# Patient Record
Sex: FEMALE | NOT HISPANIC OR LATINO | Employment: STUDENT | URBAN - NONMETROPOLITAN AREA
[De-identification: names, ages, dates, MRNs, and addresses within clinical notes are randomized per-mention and may not be internally consistent; named-entity substitution may affect disease eponyms.]

---
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
## 2023-05-11 DIAGNOSIS — F33.0 MILD EPISODE OF RECURRENT MAJOR DEPRESSIVE DISORDER: ICD-10-CM

## 2023-05-12 RX ORDER — BUPROPION HYDROCHLORIDE 150 MG/1
150 TABLET, EXTENDED RELEASE ORAL 2 TIMES DAILY
Qty: 60 TABLET | Refills: 0 | Status: SHIPPED | OUTPATIENT
Start: 2023-05-12

## 2023-06-22 PROBLEM — L05.01 PILONIDAL CYST WITH ABSCESS: Status: ACTIVE | Noted: 2023-06-22

## 2023-06-22 PROBLEM — F32.A ANXIETY AND DEPRESSION: Status: ACTIVE | Noted: 2022-03-28

## 2023-06-22 PROBLEM — F41.9 ANXIETY AND DEPRESSION: Status: ACTIVE | Noted: 2022-03-28

## 2024-02-14 ENCOUNTER — OFFICE VISIT (OUTPATIENT)
Dept: FAMILY MEDICINE CLINIC | Age: 19
End: 2024-02-14
Payer: COMMERCIAL

## 2024-02-14 VITALS
DIASTOLIC BLOOD PRESSURE: 75 MMHG | TEMPERATURE: 98.1 F | HEIGHT: 60 IN | HEART RATE: 94 BPM | WEIGHT: 176.8 LBS | SYSTOLIC BLOOD PRESSURE: 114 MMHG | BODY MASS INDEX: 34.71 KG/M2

## 2024-02-14 DIAGNOSIS — F41.9 ANXIETY AND DEPRESSION: ICD-10-CM

## 2024-02-14 DIAGNOSIS — Z30.015 ENCOUNTER FOR INITIAL PRESCRIPTION OF VAGINAL RING HORMONAL CONTRACEPTIVE: Primary | ICD-10-CM

## 2024-02-14 DIAGNOSIS — F32.A ANXIETY AND DEPRESSION: ICD-10-CM

## 2024-02-14 PROCEDURE — 99213 OFFICE O/P EST LOW 20 MIN: CPT | Performed by: NURSE PRACTITIONER

## 2024-02-14 RX ORDER — ETONOGESTREL AND ETHINYL ESTRADIOL VAGINAL RING .015; .12 MG/D; MG/D
RING VAGINAL
Qty: 1 EACH | Refills: 2 | Status: SHIPPED | OUTPATIENT
Start: 2024-02-14

## 2024-02-14 RX ORDER — BUPROPION HYDROCHLORIDE 300 MG/1
300 TABLET ORAL DAILY
Qty: 90 TABLET | Refills: 1 | Status: SHIPPED | OUTPATIENT
Start: 2024-02-14

## 2024-03-22 DIAGNOSIS — F32.A ANXIETY AND DEPRESSION: ICD-10-CM

## 2024-03-22 DIAGNOSIS — F41.9 ANXIETY AND DEPRESSION: ICD-10-CM

## 2024-03-22 RX ORDER — BUPROPION HYDROCHLORIDE 300 MG/1
300 TABLET ORAL DAILY
Qty: 90 TABLET | Refills: 1 | Status: SHIPPED | OUTPATIENT
Start: 2024-03-22

## 2024-05-08 DIAGNOSIS — F32.A ANXIETY AND DEPRESSION: ICD-10-CM

## 2024-05-08 DIAGNOSIS — Z30.015 ENCOUNTER FOR INITIAL PRESCRIPTION OF VAGINAL RING HORMONAL CONTRACEPTIVE: ICD-10-CM

## 2024-05-08 DIAGNOSIS — F41.9 ANXIETY AND DEPRESSION: ICD-10-CM

## 2024-05-08 RX ORDER — ETONOGESTREL AND ETHINYL ESTRADIOL VAGINAL RING .015; .12 MG/D; MG/D
RING VAGINAL
Qty: 1 EACH | Refills: 1 | Status: SHIPPED | OUTPATIENT
Start: 2024-05-08

## 2024-05-08 RX ORDER — BUPROPION HYDROCHLORIDE 300 MG/1
300 TABLET ORAL DAILY
Qty: 30 TABLET | Refills: 2 | Status: SHIPPED | OUTPATIENT
Start: 2024-05-08

## 2024-06-12 ENCOUNTER — LAB (OUTPATIENT)
Dept: LAB | Facility: HOSPITAL | Age: 19
End: 2024-06-12
Payer: COMMERCIAL

## 2024-06-12 ENCOUNTER — OFFICE VISIT (OUTPATIENT)
Dept: FAMILY MEDICINE CLINIC | Age: 19
End: 2024-06-12
Payer: COMMERCIAL

## 2024-06-12 VITALS
HEART RATE: 91 BPM | HEIGHT: 60 IN | TEMPERATURE: 98.4 F | SYSTOLIC BLOOD PRESSURE: 122 MMHG | DIASTOLIC BLOOD PRESSURE: 79 MMHG | WEIGHT: 179.2 LBS | BODY MASS INDEX: 35.18 KG/M2

## 2024-06-12 DIAGNOSIS — Z11.59 SCREENING FOR VIRAL DISEASE: ICD-10-CM

## 2024-06-12 DIAGNOSIS — R14.0 ABDOMINAL BLOATING: ICD-10-CM

## 2024-06-12 DIAGNOSIS — K59.09 OTHER CONSTIPATION: ICD-10-CM

## 2024-06-12 DIAGNOSIS — R12 HEARTBURN: ICD-10-CM

## 2024-06-12 DIAGNOSIS — R11.0 NAUSEA: Primary | ICD-10-CM

## 2024-06-12 DIAGNOSIS — R11.0 NAUSEA: ICD-10-CM

## 2024-06-12 DIAGNOSIS — R19.4 FREQUENT BOWEL MOVEMENTS: ICD-10-CM

## 2024-06-12 LAB
ALBUMIN SERPL-MCNC: 4.3 G/DL (ref 3.5–5.2)
ALBUMIN/GLOB SERPL: 1.5 G/DL
ALP SERPL-CCNC: 60 U/L (ref 39–117)
ALT SERPL W P-5'-P-CCNC: 27 U/L (ref 1–33)
ANION GAP SERPL CALCULATED.3IONS-SCNC: 9 MMOL/L (ref 5–15)
AST SERPL-CCNC: 18 U/L (ref 1–32)
BASOPHILS # BLD AUTO: 0.04 10*3/MM3 (ref 0–0.2)
BASOPHILS NFR BLD AUTO: 0.5 % (ref 0–1.5)
BILIRUB SERPL-MCNC: 0.2 MG/DL (ref 0–1.2)
BUN SERPL-MCNC: 12 MG/DL (ref 6–20)
BUN/CREAT SERPL: 16.7 (ref 7–25)
CALCIUM SPEC-SCNC: 8.7 MG/DL (ref 8.6–10.5)
CHLORIDE SERPL-SCNC: 107 MMOL/L (ref 98–107)
CO2 SERPL-SCNC: 22 MMOL/L (ref 22–29)
CREAT SERPL-MCNC: 0.72 MG/DL (ref 0.57–1)
DEPRECATED RDW RBC AUTO: 40.3 FL (ref 37–54)
EGFRCR SERPLBLD CKD-EPI 2021: 122.9 ML/MIN/1.73
EOSINOPHIL # BLD AUTO: 0.17 10*3/MM3 (ref 0–0.4)
EOSINOPHIL NFR BLD AUTO: 2.2 % (ref 0.3–6.2)
ERYTHROCYTE [DISTWIDTH] IN BLOOD BY AUTOMATED COUNT: 13.2 % (ref 12.3–15.4)
GLOBULIN UR ELPH-MCNC: 2.8 GM/DL
GLUCOSE SERPL-MCNC: 83 MG/DL (ref 65–99)
HCT VFR BLD AUTO: 38.2 % (ref 34–46.6)
HCV AB SER QL: NORMAL
HGB BLD-MCNC: 12.3 G/DL (ref 12–15.9)
IMM GRANULOCYTES # BLD AUTO: 0.01 10*3/MM3 (ref 0–0.05)
IMM GRANULOCYTES NFR BLD AUTO: 0.1 % (ref 0–0.5)
LYMPHOCYTES # BLD AUTO: 2.84 10*3/MM3 (ref 0.7–3.1)
LYMPHOCYTES NFR BLD AUTO: 36 % (ref 19.6–45.3)
MCH RBC QN AUTO: 26.5 PG (ref 26.6–33)
MCHC RBC AUTO-ENTMCNC: 32.2 G/DL (ref 31.5–35.7)
MCV RBC AUTO: 82.2 FL (ref 79–97)
MONOCYTES # BLD AUTO: 0.41 10*3/MM3 (ref 0.1–0.9)
MONOCYTES NFR BLD AUTO: 5.2 % (ref 5–12)
NEUTROPHILS NFR BLD AUTO: 4.41 10*3/MM3 (ref 1.7–7)
NEUTROPHILS NFR BLD AUTO: 56 % (ref 42.7–76)
PLATELET # BLD AUTO: 273 10*3/MM3 (ref 140–450)
PMV BLD AUTO: 9.9 FL (ref 6–12)
POTASSIUM SERPL-SCNC: 3.9 MMOL/L (ref 3.5–5.2)
PROT SERPL-MCNC: 7.1 G/DL (ref 6–8.5)
RBC # BLD AUTO: 4.65 10*6/MM3 (ref 3.77–5.28)
SODIUM SERPL-SCNC: 138 MMOL/L (ref 136–145)
TSH SERPL DL<=0.05 MIU/L-ACNC: 1.32 UIU/ML (ref 0.27–4.2)
WBC NRBC COR # BLD AUTO: 7.88 10*3/MM3 (ref 3.4–10.8)

## 2024-06-12 PROCEDURE — 80053 COMPREHEN METABOLIC PANEL: CPT

## 2024-06-12 PROCEDURE — 82784 ASSAY IGA/IGD/IGG/IGM EACH: CPT

## 2024-06-12 PROCEDURE — 99214 OFFICE O/P EST MOD 30 MIN: CPT | Performed by: NURSE PRACTITIONER

## 2024-06-12 PROCEDURE — 84443 ASSAY THYROID STIM HORMONE: CPT | Performed by: NURSE PRACTITIONER

## 2024-06-12 PROCEDURE — 85025 COMPLETE CBC W/AUTO DIFF WBC: CPT

## 2024-06-12 PROCEDURE — 36415 COLL VENOUS BLD VENIPUNCTURE: CPT

## 2024-06-12 PROCEDURE — 86258 DGP ANTIBODY EACH IG CLASS: CPT

## 2024-06-12 PROCEDURE — 83013 H PYLORI (C-13) BREATH: CPT

## 2024-06-12 PROCEDURE — 86803 HEPATITIS C AB TEST: CPT

## 2024-06-12 PROCEDURE — 86364 TISS TRNSGLTMNASE EA IG CLAS: CPT

## 2024-06-12 NOTE — PROGRESS NOTES
Chief Complaint  GI Problem (Having issues with heart burn, bloating, a lot of bowel movements and sometimes constipated, she said she wants to throw up every time she eats.)    Subjective          Brenda Garcia presents to [facility]    History of Present Illness  GI symptoms:   Location:mid to upper abd  Symptoms started: 4 months ago  Associated symptoms: heartburn, bloating, nausea, frequent loose BM's and constipation, abd cramps   Treatment tried: pepcid helped some, now not working, takes mirilax prn, tried to avoid gluten but has gone back to eating gluten   Weight up 3 pounds from last visit    Past Medical History: changes since 2024:      Covid + 24 mild   History of pilonidal cyst       Allergies: since 2-3 mos ago;  mild;  perennial; ; allergy testing has not been pursued;  never received immunotherapy;         Sexually active, uses condoms/nuvaring        Surgical History:      [place] teeth extraction         Family History:       Father:    Positive for Hyperlipidemia and Age 32 had Aortic stent; celiac     Mother:IBS, irritable    Positive for Hyperlipidemia;     Brother : 1    Sister: 1        Social History:      Occupation: part time "[employer]      Marital status:  Single      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 No pertinent past medical history.    No Known Allergies     History reviewed. No pertinent surgical history.     Social History     Tobacco Use    Smoking status: Never     Passive exposure: Past    Smokeless tobacco: Never   Substance Use Topics    Alcohol use: Never       History reviewed. No pertinent family history.     Health Maintenance Due   Topic Date Due    BMI FOLLOWUP  Never done    HEPATITIS C SCREENING  Never done    ANNUAL PHYSICAL  Never done        Current Outpatient Medications on File Prior to Visit   Medication Sig    buPROPion XL (Wellbutrin XL) 300 MG 24 hr tablet  "Take 1 tablet by mouth Daily.    etonogestrel-ethinyl estradiol (NuvaRing) 0.12-0.015 MG/24HR vaginal ring Insert vaginally and leave in place for 3 consecutive weeks, then remove for 1 week.     No current facility-administered medications on file prior to visit.       Immunization History   Administered Date(s) Administered    COVID-19 (PFIZER) Purple Cap Monovalent 04/09/2021, 06/13/2021    DTaP 2003, 2003, 01/29/2004, 11/01/2004, 08/06/2007    DTaP / Hep B / IPV 10/09/2013    Fluzone (or Fluarix & Flulaval for VFC) >6mos 10/07/2019    HPV Quadrivalent 06/23/2014, 01/05/2015    Hep A, 2 Dose 07/23/2013, 06/23/2014    Hep B, Adolescent or Pediatric 01/29/2004, 04/29/2004, 08/17/2004, 07/23/2013, 06/23/2014    Hib (PRP-T) 2003, 2003, 01/29/2004, 11/01/2004    IPV 2003, 2003, 11/01/2004, 08/06/2007    Influenza LAIV (Nasal) 11/27/2009, 12/21/2011, 10/09/2013    Influenza Quad Vaccine (Inpatient) 11/15/2017    MMR 11/01/2004, 08/06/2007    Meningococcal B,(Bexsero) 08/02/2019, 10/07/2019    Meningococcal Conjugate 06/23/2014, 08/02/2019    Meningococcal MCV4P (Menactra) 06/23/2014, 08/02/2019    PEDS-Pneumococcal Conjugate (PCV7) 2003, 01/29/2004, 04/29/2004    Pneumococcal Conjugate 13-Valent (PCV13) 2003, 01/29/2004, 04/29/2004    Tdap 06/23/2014    Trumenba(meningococcal B) 08/02/2019, 10/07/2019    Varicella 08/17/2004, 08/06/2007       Review of Systems   Constitutional:  Negative for fatigue and fever.   Respiratory:  Negative for cough and shortness of breath.    Cardiovascular:  Negative for chest pain and palpitations.   Gastrointestinal:  Negative for blood in stool and vomiting.        Objective     Vitals:    06/12/24 1138   BP: 122/79   BP Location: Right arm   Patient Position: Sitting   Cuff Size: Adult   Pulse: 91   Temp: 98.4 °F (36.9 °C)   TempSrc: Oral   Weight: 81.3 kg (179 lb 3.2 oz)   Height: 152.4 cm (60\")            Physical Exam  Vitals " reviewed.   Constitutional:       General: She is not in acute distress.     Appearance: Normal appearance.   Cardiovascular:      Rate and Rhythm: Normal rate and regular rhythm.      Heart sounds: Normal heart sounds. No murmur heard.  Pulmonary:      Effort: Pulmonary effort is normal. No respiratory distress.      Breath sounds: Normal breath sounds.   Abdominal:      Palpations: Abdomen is soft.      Tenderness: There is abdominal tenderness (mild to right mid abd). There is no guarding or rebound.   Neurological:      Mental Status: She is alert.   Psychiatric:         Mood and Affect: Mood normal.         Behavior: Behavior normal.         Result Review :     The following data was reviewed by: KAYKAY Cruz on 06/12/2024:                       Assessment and Plan      Diagnoses and all orders for this visit:    1. Nausea (Primary)  Assessment & Plan:  Checking labs    Orders:  -     Comprehensive metabolic panel; Future  -     H. Pylori Breath Test - Breath, Lung; Future    2. Other constipation  Assessment & Plan:  Checking labs     Orders:  -     CBC w AUTO Differential; Future  -     Comprehensive metabolic panel; Future    3. Frequent bowel movements  Assessment & Plan:  Checking labs     Orders:  -     TSH Rfx On Abnormal To Free T4    4. Heartburn  Assessment & Plan:  Checking labs, may try a PPI, may need to see GE, discussed diet /life style choices that can help     Orders:  -     Cancel: POC Urea Breath Test  -     H. Pylori Breath Test - Breath, Lung; Future    5. Screening for viral disease  Assessment & Plan:  Discussed and will screen with hep c    Orders:  -     Hepatitis C Antibody; Future    6. Abdominal bloating  Assessment & Plan:  Checking labs, discussed gluten free diet, may need to see GE     Orders:  -     CBC w AUTO Differential; Future  -     Celiac Panel Reflex To Titer; Future  -     Cancel: POC Urea Breath Test  -     H. Pylori Breath Test - Breath, Lung;  Future        Class 2 Severe Obesity (BMI >=35 and <=39.9). Obesity-related health conditions include the following: none. Obesity is unchanged. BMI is is above average; BMI management plan is completed. We discussed portion control and increasing exercise.         Follow Up     Return if symptoms worsen or fail to improve, for followup pending lab results.    Patient was given instructions and counseling regarding her condition or for health maintenance advice. Please see specific information pulled into the AVS if appropriate.

## 2024-06-12 NOTE — ASSESSMENT & PLAN NOTE
Checking labs, may try a PPI, may need to see GE, discussed diet /life style choices that can help

## 2024-06-14 LAB
GLIADIN PEPTIDE IGA SER-ACNC: 4 UNITS (ref 0–19)
IGA SERPL-MCNC: 205 MG/DL (ref 87–352)
TTG IGA SER-ACNC: <2 U/ML (ref 0–3)
UREA BREATH TEST QL: NEGATIVE

## 2024-06-16 DIAGNOSIS — Z30.015 ENCOUNTER FOR INITIAL PRESCRIPTION OF VAGINAL RING HORMONAL CONTRACEPTIVE: ICD-10-CM

## 2024-06-17 DIAGNOSIS — R12 HEARTBURN: Primary | ICD-10-CM

## 2024-06-17 RX ORDER — ETONOGESTREL AND ETHINYL ESTRADIOL VAGINAL .015; .12 MG/D; MG/D
RING VAGINAL
Qty: 2 EACH | Refills: 5 | OUTPATIENT
Start: 2024-06-17

## 2024-06-17 RX ORDER — PANTOPRAZOLE SODIUM 40 MG/1
40 TABLET, DELAYED RELEASE ORAL DAILY
Qty: 30 TABLET | Refills: 2 | Status: SHIPPED | OUTPATIENT
Start: 2024-06-17

## 2024-07-17 DIAGNOSIS — Z30.015 ENCOUNTER FOR INITIAL PRESCRIPTION OF VAGINAL RING HORMONAL CONTRACEPTIVE: ICD-10-CM

## 2024-07-17 RX ORDER — ETONOGESTREL AND ETHINYL ESTRADIOL VAGINAL RING .015; .12 MG/D; MG/D
RING VAGINAL
Qty: 1 EACH | Refills: 1 | Status: SHIPPED | OUTPATIENT
Start: 2024-07-17

## 2024-07-17 NOTE — TELEPHONE ENCOUNTER
Rx Refill Note  Requested Prescriptions     Pending Prescriptions Disp Refills    etonogestrel-ethinyl estradiol (NuvaRing) 0.12-0.015 MG/24HR vaginal ring 1 each 1     Sig: Insert vaginally and leave in place for 3 consecutive weeks, then remove for 1 week.      Last office visit with prescribing clinician: 6/12/2024   Last telemedicine visit with prescribing clinician: Visit date not found   Next office visit with prescribing clinician: Visit date not found       Kimmie Quinn LPN  07/17/24, 11:36 EDT    -5/8/24 !1, RF-1

## 2024-07-17 NOTE — TELEPHONE ENCOUNTER
Seen last month, last visit for this was 2-2024. Will send in refills, then needs follow up re this

## 2024-08-16 DIAGNOSIS — F32.A ANXIETY AND DEPRESSION: ICD-10-CM

## 2024-08-16 DIAGNOSIS — F41.9 ANXIETY AND DEPRESSION: ICD-10-CM

## 2024-08-19 RX ORDER — BUPROPION HYDROCHLORIDE 300 MG/1
300 TABLET ORAL DAILY
Qty: 30 TABLET | Refills: 2 | Status: SHIPPED | OUTPATIENT
Start: 2024-08-19

## 2024-08-25 DIAGNOSIS — Z30.015 ENCOUNTER FOR INITIAL PRESCRIPTION OF VAGINAL RING HORMONAL CONTRACEPTIVE: ICD-10-CM

## 2024-08-26 RX ORDER — ETONOGESTREL AND ETHINYL ESTRADIOL VAGINAL .015; .12 MG/D; MG/D
RING VAGINAL
Qty: 2 EACH | Refills: 0 | Status: SHIPPED | OUTPATIENT
Start: 2024-08-26

## 2024-09-17 ENCOUNTER — OFFICE VISIT (OUTPATIENT)
Dept: FAMILY MEDICINE CLINIC | Age: 19
End: 2024-09-17
Payer: COMMERCIAL

## 2024-09-17 VITALS
BODY MASS INDEX: 34.95 KG/M2 | HEART RATE: 102 BPM | HEIGHT: 60 IN | WEIGHT: 178 LBS | SYSTOLIC BLOOD PRESSURE: 127 MMHG | TEMPERATURE: 98.2 F | DIASTOLIC BLOOD PRESSURE: 86 MMHG

## 2024-09-17 DIAGNOSIS — F32.A ANXIETY AND DEPRESSION: Primary | ICD-10-CM

## 2024-09-17 DIAGNOSIS — F41.9 ANXIETY AND DEPRESSION: Primary | ICD-10-CM

## 2024-09-17 PROCEDURE — 99213 OFFICE O/P EST LOW 20 MIN: CPT | Performed by: NURSE PRACTITIONER

## 2024-09-17 RX ORDER — AMOXICILLIN 500 MG/1
CAPSULE ORAL
COMMUNITY
Start: 2024-09-06

## 2024-09-17 RX ORDER — ESCITALOPRAM OXALATE 10 MG/1
10 TABLET ORAL DAILY
Qty: 30 TABLET | Refills: 2 | Status: SHIPPED | OUTPATIENT
Start: 2024-09-17

## 2024-10-02 DIAGNOSIS — R12 HEARTBURN: ICD-10-CM

## 2024-10-03 RX ORDER — PANTOPRAZOLE SODIUM 40 MG/1
40 TABLET, DELAYED RELEASE ORAL DAILY
Qty: 90 TABLET | Refills: 0 | Status: SHIPPED | OUTPATIENT
Start: 2024-10-03

## 2024-10-29 DIAGNOSIS — Z30.015 ENCOUNTER FOR INITIAL PRESCRIPTION OF VAGINAL RING HORMONAL CONTRACEPTIVE: ICD-10-CM

## 2024-10-29 NOTE — TELEPHONE ENCOUNTER
Rx Refill Note  Requested Prescriptions     Pending Prescriptions Disp Refills    etonogestrel-ethinyl estradiol (NUVARING) 0.12-0.015 MG/24HR vaginal ring [Pharmacy Med Name: ETONOGEST/EE VAG RING] 2 each 5     Sig: INSERT 1 RING VAGINALLY, LEAVE   IN PLACE FOR 3 CONSECUTIVE   WEEKS, REMOVE FOR 1 WEEK, THEN  INSERT NEW RING      Last office visit with prescribing clinician: 9/17/2024   Last telemedicine visit with prescribing clinician: Visit date not found   Next office visit with prescribing clinician: Visit date not found      Ryanne Paz LPN  10/29/24, 08:21 EDT

## 2024-11-04 NOTE — TELEPHONE ENCOUNTER
Failed protocol    LOV  8/25/24    LF  9/20/24  56 days     LMTRC    Needs WWE/PAP with [facility]

## 2024-11-06 ENCOUNTER — OFFICE VISIT (OUTPATIENT)
Dept: FAMILY MEDICINE CLINIC | Age: 19
End: 2024-11-06
Payer: COMMERCIAL

## 2024-11-06 VITALS
HEART RATE: 85 BPM | BODY MASS INDEX: 35.61 KG/M2 | TEMPERATURE: 99.5 F | WEIGHT: 181.4 LBS | DIASTOLIC BLOOD PRESSURE: 78 MMHG | HEIGHT: 60 IN | SYSTOLIC BLOOD PRESSURE: 125 MMHG

## 2024-11-06 DIAGNOSIS — F32.A ANXIETY AND DEPRESSION: ICD-10-CM

## 2024-11-06 DIAGNOSIS — Z23 IMMUNIZATION DUE: ICD-10-CM

## 2024-11-06 DIAGNOSIS — F41.9 ANXIETY AND DEPRESSION: ICD-10-CM

## 2024-11-06 DIAGNOSIS — Z30.44 ENCOUNTER FOR SURVEILLANCE OF VAGINAL RING HORMONAL CONTRACEPTIVE DEVICE: Primary | ICD-10-CM

## 2024-11-06 PROBLEM — R11.0 NAUSEA: Status: RESOLVED | Noted: 2024-06-12 | Resolved: 2024-11-06

## 2024-11-06 PROBLEM — R05.9 COUGH: Status: RESOLVED | Noted: 2021-11-17 | Resolved: 2024-11-06

## 2024-11-06 PROBLEM — J02.9 SORE THROAT: Status: RESOLVED | Noted: 2021-11-17 | Resolved: 2024-11-06

## 2024-11-06 PROBLEM — R14.0 ABDOMINAL BLOATING: Status: RESOLVED | Noted: 2024-06-12 | Resolved: 2024-11-06

## 2024-11-06 PROBLEM — R12 HEARTBURN: Status: RESOLVED | Noted: 2024-06-12 | Resolved: 2024-11-06

## 2024-11-06 PROBLEM — K59.09 OTHER CONSTIPATION: Status: RESOLVED | Noted: 2024-06-12 | Resolved: 2024-11-06

## 2024-11-06 PROBLEM — R19.4 FREQUENT BOWEL MOVEMENTS: Status: RESOLVED | Noted: 2024-06-12 | Resolved: 2024-11-06

## 2024-11-06 PROBLEM — L05.01 PILONIDAL CYST WITH ABSCESS: Status: RESOLVED | Noted: 2023-06-22 | Resolved: 2024-11-06

## 2024-11-06 PROCEDURE — 99213 OFFICE O/P EST LOW 20 MIN: CPT | Performed by: NURSE PRACTITIONER

## 2024-11-06 PROCEDURE — 90656 IIV3 VACC NO PRSV 0.5 ML IM: CPT | Performed by: NURSE PRACTITIONER

## 2024-11-06 PROCEDURE — 90471 IMMUNIZATION ADMIN: CPT | Performed by: NURSE PRACTITIONER

## 2024-11-06 RX ORDER — ETONOGESTREL AND ETHINYL ESTRADIOL VAGINAL RING .015; .12 MG/D; MG/D
RING VAGINAL
Qty: 9 EACH | Refills: 0 | Status: SHIPPED | OUTPATIENT
Start: 2024-11-06

## 2024-11-06 RX ORDER — ETONOGESTREL AND ETHINYL ESTRADIOL VAGINAL RING .015; .12 MG/D; MG/D
RING VAGINAL
Qty: 2 EACH | Refills: 5 | OUTPATIENT
Start: 2024-11-06

## 2024-11-06 RX ORDER — ESCITALOPRAM OXALATE 20 MG/1
20 TABLET ORAL DAILY
Qty: 30 TABLET | Refills: 2 | Status: SHIPPED | OUTPATIENT
Start: 2024-11-06

## 2024-11-06 NOTE — ASSESSMENT & PLAN NOTE
Discussed ACHES, will send in a refill but she is due a Pap smear, advised to schedule with [facility]

## 2024-11-06 NOTE — PROGRESS NOTES
Chief Complaint  Med Refill (Refill on nuvaring. )    Subjective          Brenda Traci Garcia presents to [facility]    History of Present Illness  Needs refills of nuvating   Current contraceptive: nuvaring,needs a refill to her mail order pharmacy  Cycles regular but heavy   Sexually active yes, same partner   Plans on seeeing a GYN for [employer] in the near future, may be moving to [facility]     On lexapro, thinks it is not adequately helping, will finish school next month, wants my recommendation, needs refill to [facility]     Past Medical History: changes since 2024:      Covid + 2-7-24 mild   History of pilonidal cyst        Allergies: since 2-3 mos ago;  mild;  perennial; ; allergy testing has not been pursued;  never received immunotherapy;         Sexually active, uses condoms/nuvaring        Surgical History:      [place] teeth extraction         Family History:        Father:    Positive for Hyperlipidemia and Age 32 had Aortic stent; celiac     Mother:IBS, irritable, anxiety     Positive for Hyperlipidemia;     Brother : 1 anxiety     Sister: 1 anxiety        Social History:      Occupation: part time [employer]      Marital status:  Single, has a boyfriend       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 No pertinent past medical history.    No Known Allergies     History reviewed. No pertinent surgical history.     Social History     Tobacco Use    Smoking status: Never     Passive exposure: Past    Smokeless tobacco: Never   Substance Use Topics    Alcohol use: Never       History reviewed. No pertinent family history.     Health Maintenance Due   Topic Date Due    PAP SMEAR  Never done    TDAP/TD VACCINES (2 - Td or Tdap) 2024        Current Outpatient Medications on File Prior to Visit    Medication Sig    buPROPion XL (WELLBUTRIN XL) 300 MG 24 hr tablet TAKE 1 TABLET BY MOUTH DAILY    pantoprazole (PROTONIX) 40 MG EC tablet TAKE 1 TABLET BY MOUTH DAILY    [DISCONTINUED] EnilloRing 0.12-0.015 MG/24HR vaginal ring INSERT 1 RING VAGINALLY, LEAVE  IN PLACE FOR 3 CONSECUTIVE  WEEKS, REMOVE FOR 1 WEEK, THEN  INSERT NEW RING    [DISCONTINUED] escitalopram (Lexapro) 10 MG tablet Take 1 tablet by mouth Daily.    [DISCONTINUED] amoxicillin (AMOXIL) 500 MG capsule      No current facility-administered medications on file prior to visit.       Immunization History   Administered Date(s) Administered    COVID-19 (PFIZER) Purple Cap Monovalent 04/09/2021, 06/13/2021    DTaP 2003, 2003, 01/29/2004, 11/01/2004, 08/06/2007    DTaP / Hep B / IPV 10/09/2013    FluMist 2-49yrs (Nasal) 11/27/2009, 12/21/2011, 10/09/2013    Fluzone  >6mos 11/15/2017, 11/06/2024    Fluzone (or Fluarix & Flulaval for VFC) >6mos 10/07/2019    HPV Quadrivalent 06/23/2014, 01/05/2015    Hep A, 2 Dose 07/23/2013, 06/23/2014    Hep B, Adolescent or Pediatric 01/29/2004, 04/29/2004, 08/17/2004, 07/23/2013, 06/23/2014    Hib (PRP-T) 2003, 2003, 01/29/2004, 11/01/2004    IPV 2003, 2003, 11/01/2004, 08/06/2007    MMR 11/01/2004, 08/06/2007    Meningococcal B,(Bexsero) 08/02/2019, 10/07/2019    Meningococcal Conjugate 06/23/2014, 08/02/2019    Meningococcal MCV4P (Menactra) 06/23/2014, 08/02/2019    PEDS-Pneumococcal Conjugate (PCV7) 2003, 01/29/2004, 04/29/2004    Pneumococcal Conjugate 13-Valent (PCV13) 2003, 01/29/2004, 04/29/2004    Tdap 06/23/2014    Trumenba(meningococcal B) 08/02/2019, 10/07/2019    Varicella 08/17/2004, 08/06/2007       Review of Systems   Constitutional:  Negative for fatigue and fever.   Respiratory:  Negative for cough and shortness of breath.    Cardiovascular:  Negative for chest pain, palpitations and leg swelling.        Objective     Vitals:    11/06/24 1412   BP:  "125/78   BP Location: Left arm   Patient Position: Sitting   Cuff Size: Adult   Pulse: 85   Temp: 99.5 °F (37.5 °C)   TempSrc: Oral   Weight: 82.3 kg (181 lb 6.4 oz)   Height: 152.4 cm (60\")            Physical Exam  Vitals reviewed.   Constitutional:       General: She is not in acute distress.     Appearance: Normal appearance.   Cardiovascular:      Rate and Rhythm: Normal rate and regular rhythm.      Heart sounds: Normal heart sounds. No murmur heard.  Pulmonary:      Effort: Pulmonary effort is normal. No respiratory distress.      Breath sounds: Normal breath sounds.   Neurological:      Mental Status: She is alert.   Psychiatric:         Mood and Affect: Mood normal.         Behavior: Behavior normal.         Result Review :     The following data was reviewed by: KAYKAY Cruz on 11/06/2024:                       Assessment and Plan      Diagnoses and all orders for this visit:    1. Encounter for surveillance of vaginal ring hormonal contraceptive device (Primary)  Assessment & Plan:  Discussed ACHES, will send in a refill but she is due a Pap smear, advised to schedule with [facility]     Orders:  -     etonogestrel-ethinyl estradiol (EnilloRing) 0.12-0.015 MG/24HR vaginal ring; Insert vaginally and leave in place for 3 consecutive weeks, then remove for 1 week.  Dispense: 9 each; Refill: 0    2. Anxiety and depression  Assessment & Plan:  continue wellbutrin, will increase her lexapro, if not improving, follow up    Orders:  -     escitalopram (Lexapro) 20 MG tablet; Take 1 tablet by mouth Daily.  Dispense: 30 tablet; Refill: 2    3. Immunization due  Assessment & Plan:  Update her with flu vaccine today     Orders:  -     Fluzone >6mos ([id number])                  Follow Up     Return if symptoms worsen or fail to improve.    Patient was given instructions and counseling regarding her condition or for health maintenance advice. Please see specific information pulled into the " AVS if appropriate.       Answers submitted by the patient for this visit:  Primary Reason for Visit (Submitted on 11/6/2024)  What is the primary reason for your visit?: Birth Control

## 2024-12-06 DIAGNOSIS — F32.A ANXIETY AND DEPRESSION: ICD-10-CM

## 2024-12-06 DIAGNOSIS — F41.9 ANXIETY AND DEPRESSION: ICD-10-CM

## 2024-12-09 RX ORDER — ESCITALOPRAM OXALATE 10 MG/1
10 TABLET ORAL DAILY
Qty: 30 TABLET | Refills: 2 | OUTPATIENT
Start: 2024-12-09

## 2024-12-09 NOTE — TELEPHONE ENCOUNTER
Rx Refill Note  Requested Prescriptions     Pending Prescriptions Disp Refills    escitalopram (LEXAPRO) 10 MG tablet [Pharmacy Med Name: ESCITALOPRAM 10 MG TABLET] 30 tablet 2     Sig: TAKE 1 TABLET BY MOUTH DAILY      Last office visit with prescribing clinician: 11/6/2024   Last telemedicine visit with prescribing clinician: Visit date not found   Next office visit with prescribing clinician: Visit date not found      The original prescription was discontinued on 11/6/2024 by Lizz Guillory APRN for the following reason: Dose adjustment. Renewing this prescription may not be appropriate.   His dose was increased to 20mg.     Ryanne Paz LPN  12/09/24, 11:45 EST

## 2024-12-10 DIAGNOSIS — F32.A ANXIETY AND DEPRESSION: ICD-10-CM

## 2024-12-10 DIAGNOSIS — F41.9 ANXIETY AND DEPRESSION: ICD-10-CM

## 2024-12-11 RX ORDER — BUPROPION HYDROCHLORIDE 300 MG/1
300 TABLET ORAL DAILY
Qty: 90 TABLET | Refills: 1 | Status: SHIPPED | OUTPATIENT
Start: 2024-12-11

## 2024-12-13 DIAGNOSIS — F41.9 ANXIETY AND DEPRESSION: Primary | ICD-10-CM

## 2024-12-13 DIAGNOSIS — F32.A ANXIETY AND DEPRESSION: Primary | ICD-10-CM

## 2024-12-30 DIAGNOSIS — R12 HEARTBURN: ICD-10-CM

## 2024-12-30 RX ORDER — PANTOPRAZOLE SODIUM 40 MG/1
40 TABLET, DELAYED RELEASE ORAL DAILY
Qty: 90 TABLET | Refills: 1 | Status: SHIPPED | OUTPATIENT
Start: 2024-12-30

## 2025-01-15 ENCOUNTER — HOSPITAL ENCOUNTER (EMERGENCY)
Facility: HOSPITAL | Age: 20
Discharge: HOME | End: 2025-01-15
Attending: EMERGENCY MEDICINE
Payer: COMMERCIAL

## 2025-01-15 ENCOUNTER — APPOINTMENT (OUTPATIENT)
Dept: CT IMAGING | Facility: HOSPITAL | Age: 20
End: 2025-01-15
Payer: COMMERCIAL

## 2025-01-15 VITALS
TEMPERATURE: 98 F | HEART RATE: 92 BPM | OXYGEN SATURATION: 95 % | HEIGHT: 60 IN | DIASTOLIC BLOOD PRESSURE: 68 MMHG | BODY MASS INDEX: 38.22 KG/M2 | SYSTOLIC BLOOD PRESSURE: 101 MMHG | WEIGHT: 194.67 LBS | RESPIRATION RATE: 16 BRPM

## 2025-01-15 DIAGNOSIS — R10.32 LEFT LOWER QUADRANT ABDOMINAL PAIN: Primary | ICD-10-CM

## 2025-01-15 LAB
ALBUMIN SERPL-MCNC: 4.1 G/DL (ref 3.5–5.2)
ALBUMIN/GLOB SERPL: 1.2 G/DL
ALP SERPL-CCNC: 58 U/L (ref 39–117)
ALT SERPL W P-5'-P-CCNC: 22 U/L (ref 1–33)
ANION GAP SERPL CALCULATED.3IONS-SCNC: 13.2 MMOL/L (ref 5–15)
AST SERPL-CCNC: 22 U/L (ref 1–32)
BACTERIA UR QL AUTO: ABNORMAL /HPF
BASOPHILS # BLD AUTO: 0.04 10*3/MM3 (ref 0–0.2)
BASOPHILS NFR BLD AUTO: 0.3 % (ref 0–1.5)
BILIRUB SERPL-MCNC: 0.4 MG/DL (ref 0–1.2)
BILIRUB UR QL STRIP: NEGATIVE
BUN SERPL-MCNC: 6 MG/DL (ref 6–20)
BUN/CREAT SERPL: 10 (ref 7–25)
CALCIUM SPEC-SCNC: 8.8 MG/DL (ref 8.6–10.5)
CHLORIDE SERPL-SCNC: 105 MMOL/L (ref 98–107)
CLARITY UR: CLEAR
CO2 SERPL-SCNC: 18.8 MMOL/L (ref 22–29)
COLOR UR: YELLOW
CREAT SERPL-MCNC: 0.6 MG/DL (ref 0.57–1)
DEPRECATED RDW RBC AUTO: 41.2 FL (ref 37–54)
EGFRCR SERPLBLD CKD-EPI 2021: 131.2 ML/MIN/1.73
EOSINOPHIL # BLD AUTO: 0.04 10*3/MM3 (ref 0–0.4)
EOSINOPHIL NFR BLD AUTO: 0.3 % (ref 0.3–6.2)
ERYTHROCYTE [DISTWIDTH] IN BLOOD BY AUTOMATED COUNT: 13.6 % (ref 12.3–15.4)
GLOBULIN UR ELPH-MCNC: 3.5 GM/DL
GLUCOSE SERPL-MCNC: 105 MG/DL (ref 65–99)
GLUCOSE UR STRIP-MCNC: NEGATIVE MG/DL
HCG INTACT+B SERPL-ACNC: <0.5 MIU/ML
HCT VFR BLD AUTO: 40.9 % (ref 34–46.6)
HGB BLD-MCNC: 13.1 G/DL (ref 12–15.9)
HGB UR QL STRIP.AUTO: ABNORMAL
HOLD SPECIMEN: NORMAL
HOLD SPECIMEN: NORMAL
HYALINE CASTS UR QL AUTO: ABNORMAL /LPF
IMM GRANULOCYTES # BLD AUTO: 0.06 10*3/MM3 (ref 0–0.05)
IMM GRANULOCYTES NFR BLD AUTO: 0.5 % (ref 0–0.5)
KETONES UR QL STRIP: NEGATIVE
LEUKOCYTE ESTERASE UR QL STRIP.AUTO: NEGATIVE
LIPASE SERPL-CCNC: 29 U/L (ref 13–60)
LYMPHOCYTES # BLD AUTO: 1.62 10*3/MM3 (ref 0.7–3.1)
LYMPHOCYTES NFR BLD AUTO: 12.7 % (ref 19.6–45.3)
MCH RBC QN AUTO: 26.7 PG (ref 26.6–33)
MCHC RBC AUTO-ENTMCNC: 32 G/DL (ref 31.5–35.7)
MCV RBC AUTO: 83.3 FL (ref 79–97)
MONOCYTES # BLD AUTO: 0.47 10*3/MM3 (ref 0.1–0.9)
MONOCYTES NFR BLD AUTO: 3.7 % (ref 5–12)
NEUTROPHILS NFR BLD AUTO: 10.54 10*3/MM3 (ref 1.7–7)
NEUTROPHILS NFR BLD AUTO: 82.5 % (ref 42.7–76)
NITRITE UR QL STRIP: NEGATIVE
NRBC BLD AUTO-RTO: 0 /100 WBC (ref 0–0.2)
PH UR STRIP.AUTO: 5.5 [PH] (ref 5–8)
PLATELET # BLD AUTO: 276 10*3/MM3 (ref 140–450)
PMV BLD AUTO: 10.5 FL (ref 6–12)
POTASSIUM SERPL-SCNC: 3.6 MMOL/L (ref 3.5–5.2)
PROT SERPL-MCNC: 7.6 G/DL (ref 6–8.5)
PROT UR QL STRIP: NEGATIVE
RBC # BLD AUTO: 4.91 10*6/MM3 (ref 3.77–5.28)
RBC # UR STRIP: ABNORMAL /HPF
REF LAB TEST METHOD: ABNORMAL
SODIUM SERPL-SCNC: 137 MMOL/L (ref 136–145)
SP GR UR STRIP: 1.02 (ref 1–1.03)
SQUAMOUS #/AREA URNS HPF: ABNORMAL /HPF
UROBILINOGEN UR QL STRIP: ABNORMAL
WBC # UR STRIP: ABNORMAL /HPF
WBC NRBC COR # BLD AUTO: 12.77 10*3/MM3 (ref 3.4–10.8)
WHOLE BLOOD HOLD COAG: NORMAL
WHOLE BLOOD HOLD SPECIMEN: NORMAL

## 2025-01-15 PROCEDURE — 74177 CT ABD & PELVIS W/CONTRAST: CPT

## 2025-01-15 PROCEDURE — 84702 CHORIONIC GONADOTROPIN TEST: CPT | Performed by: EMERGENCY MEDICINE

## 2025-01-15 PROCEDURE — 99285 EMERGENCY DEPT VISIT HI MDM: CPT

## 2025-01-15 PROCEDURE — 36415 COLL VENOUS BLD VENIPUNCTURE: CPT

## 2025-01-15 PROCEDURE — 83690 ASSAY OF LIPASE: CPT | Performed by: EMERGENCY MEDICINE

## 2025-01-15 PROCEDURE — 25510000001 IOPAMIDOL PER 1 ML: Performed by: EMERGENCY MEDICINE

## 2025-01-15 PROCEDURE — 81001 URINALYSIS AUTO W/SCOPE: CPT | Performed by: EMERGENCY MEDICINE

## 2025-01-15 PROCEDURE — 80053 COMPREHEN METABOLIC PANEL: CPT | Performed by: EMERGENCY MEDICINE

## 2025-01-15 PROCEDURE — 85025 COMPLETE CBC W/AUTO DIFF WBC: CPT | Performed by: EMERGENCY MEDICINE

## 2025-01-15 RX ORDER — SODIUM CHLORIDE 0.9 % (FLUSH) 0.9 %
10 SYRINGE (ML) INJECTION AS NEEDED
Status: DISCONTINUED | OUTPATIENT
Start: 2025-01-15 | End: 2025-01-15 | Stop reason: HOSPADM

## 2025-01-15 RX ORDER — ONDANSETRON 4 MG/1
4 TABLET, ORALLY DISINTEGRATING ORAL EVERY 8 HOURS PRN
Qty: 15 TABLET | Refills: 0 | Status: SHIPPED | OUTPATIENT
Start: 2025-01-15

## 2025-01-15 RX ORDER — DICYCLOMINE HCL 20 MG
20 TABLET ORAL EVERY 6 HOURS
Qty: 20 TABLET | Refills: 0 | Status: SHIPPED | OUTPATIENT
Start: 2025-01-15

## 2025-01-15 RX ORDER — IOPAMIDOL 755 MG/ML
100 INJECTION, SOLUTION INTRAVASCULAR
Status: COMPLETED | OUTPATIENT
Start: 2025-01-15 | End: 2025-01-15

## 2025-01-15 RX ADMIN — IOPAMIDOL 100 ML: 755 INJECTION, SOLUTION INTRAVENOUS at 12:27

## 2025-01-15 NOTE — DISCHARGE INSTRUCTIONS
Please follow-up with your gynecologist as discussed today in the ED.  Return to the ED for any new or worsening concerning symptoms.  You have been prescribed medications for abdominal pain and nausea you may  at your pharmacy today and begin taking.

## 2025-01-15 NOTE — ED PROVIDER NOTES
Time: 12:29 PM EST  Date of encounter:  1/15/2025  Independent Historian/Clinical History and Information was obtained by:   Patient    History is limited by: N/A    Chief Complaint: Abdominal pain      History of Present Illness:  Patient is a 19 y.o. year old female who presents to the emergency department for evaluation of lower abdominal pain that was worse in the left lower quadrant and began this morning.  Patient reports history of severe cramping with menstrual cycles and that her last menstrual cycle began 3 days ago.  Denies any heavy bleeding, or bleeding having of saturate 1 pad per hour.  Also reports diarrhea that began this morning.  Denies any nausea or vomiting.  Denies any fevers, chills, body aches.      Patient Care Team  Primary Care Provider: Lizz Guillory APRN    Past Medical History:     No Known Allergies  History reviewed. No pertinent past medical history.  History reviewed. No pertinent surgical history.  History reviewed. No pertinent family history.    Home Medications:  Prior to Admission medications    Medication Sig Start Date End Date Taking? Authorizing Provider   buPROPion XL (WELLBUTRIN XL) 300 MG 24 hr tablet TAKE 1 TABLET BY MOUTH DAILY 12/11/24   Lizz Guillory APRN   escitalopram (Lexapro) 20 MG tablet Take 1 tablet by mouth Daily. 11/6/24   Lizz Guillory APRN   etonogestrel-ethinyl estradiol (EnilloRing) 0.12-0.015 MG/24HR vaginal ring Insert vaginally and leave in place for 3 consecutive weeks, then remove for 1 week. 11/6/24   Lizz Guillory APRN   pantoprazole (PROTONIX) 40 MG EC tablet Take 1 tablet by mouth Daily. 12/30/24   Lizz Guillory APRN        Social History:   Social History     Tobacco Use    Smoking status: Never     Passive exposure: Past    Smokeless tobacco: Never   Vaping Use    Vaping status: Never Used   Substance Use Topics    Alcohol use: Never    Drug use: Never         Review of Systems:  Review of Systems  "  Constitutional:  Negative for chills, fatigue and fever.   HENT:  Negative for ear pain, rhinorrhea and sore throat.    Eyes:  Negative for visual disturbance.   Respiratory:  Negative for cough and shortness of breath.    Cardiovascular:  Negative for chest pain.   Gastrointestinal:  Positive for abdominal pain and diarrhea. Negative for vomiting.   Genitourinary:  Negative for difficulty urinating.   Musculoskeletal:  Negative for arthralgias, back pain and myalgias.   Skin:  Negative for rash.   Neurological:  Negative for light-headedness and headaches.   Hematological:  Negative for adenopathy.   Psychiatric/Behavioral: Negative.          Physical Exam:  /68   Pulse 92   Temp 98 °F (36.7 °C) (Oral)   Resp 16   Ht 152.4 cm (60\")   Wt 88.3 kg (194 lb 10.7 oz)   LMP 01/12/2025 (Exact Date)   SpO2 95%   BMI 38.02 kg/m²     Physical Exam  Vitals and nursing note reviewed.   Constitutional:       General: She is not in acute distress.     Appearance: Normal appearance. She is not toxic-appearing.   HENT:      Head: Normocephalic and atraumatic.      Nose: Nose normal.      Mouth/Throat:      Mouth: Mucous membranes are moist.   Eyes:      Conjunctiva/sclera: Conjunctivae normal.   Cardiovascular:      Rate and Rhythm: Normal rate.      Pulses: Normal pulses.      Heart sounds: Normal heart sounds.   Pulmonary:      Effort: Pulmonary effort is normal.      Breath sounds: Normal breath sounds.   Abdominal:      General: Bowel sounds are normal.      Palpations: Abdomen is soft.      Tenderness: There is abdominal tenderness (Left lower quadrant).   Musculoskeletal:         General: Normal range of motion.      Cervical back: Normal range of motion.   Skin:     General: Skin is warm and dry.   Neurological:      General: No focal deficit present.      Mental Status: She is alert and oriented to person, place, and time.   Psychiatric:         Mood and Affect: Mood normal.         Behavior: Behavior " normal.         Thought Content: Thought content normal.         Judgment: Judgment normal.                    Medical Decision Making:      Comorbidities that affect care:    None    External Notes reviewed:    None      The following orders were placed and all results were independently analyzed by me:  Orders Placed This Encounter   Procedures    CT Abdomen Pelvis With Contrast    [place] Draw    Comprehensive Metabolic Panel    Lipase    Urinalysis With Microscopic If Indicated (No Culture) - Urine, Clean Catch    hCG, Quantitative, Pregnancy    CBC Auto Differential    Urinalysis, Microscopic Only - Urine, Clean Catch    Undress & Gown    CBC & Differential    Green Top (Gel)    Lavender Top    Gold Top - SST    Light Blue Top       Medications Given in the Emergency Department:  Medications   iopamidol (ISOVUE-370) 76 % injection 100 mL (100 mL Intravenous Given 1/15/25 1227)        ED Course:         Labs:    Lab Results (last 24 hours)       Procedure Component Value Units Date/Time    CBC & Differential [[id number]]  (Abnormal) Collected: 01/15/25 1056    Specimen: Blood Updated: 01/15/25 1104    Narrative:      The following orders were created for panel order CBC & Differential.  Procedure                               Abnormality         Status                     ---------                               -----------         ------                     CBC Auto Differential[[id number]]        Abnormal            Final result                 Please view results for these tests on the individual orders.    Comprehensive Metabolic Panel [[id number]]  (Abnormal) Collected: 01/15/25 1056    Specimen: Blood Updated: 01/15/25 1125     Glucose 105 mg/dL      BUN 6 mg/dL      Creatinine 0.60 mg/dL      Sodium 137 mmol/L      Potassium 3.6 mmol/L      Chloride 105 mmol/L      CO2 18.8 mmol/L      Calcium 8.8 mg/dL      Total Protein 7.6 g/dL      Albumin 4.1 g/dL      ALT (SGPT) 22 U/L      AST (SGOT) 22 U/L       Alkaline Phosphatase 58 U/L      Total Bilirubin 0.4 mg/dL      Globulin 3.5 gm/dL      A/G Ratio 1.2 g/dL      BUN/Creatinine Ratio 10.0     Anion Gap 13.2 mmol/L      eGFR 131.2 mL/min/1.73     Narrative:      GFR Categories in Chronic Kidney Disease (CKD)      GFR Category          GFR (mL/min/1.73)    Interpretation  G1                     90 or greater         Normal or high (1)  G2                      60-89                Mild decrease (1)  G3a                   45-59                Mild to moderate decrease  G3b                   30-44                Moderate to severe decrease  G4                    15-29                Severe decrease  G5                    14 or less           Kidney failure          (1)In the absence of evidence of kidney disease, neither GFR category G1 or G2 fulfill the criteria for CKD.    eGFR calculation 2021 CKD-EPI creatinine equation, which does not include race as a factor    Lipase [[id number]]  (Normal) Collected: 01/15/25 1056    Specimen: Blood Updated: 01/15/25 1125     Lipase 29 U/L     Urinalysis With Microscopic If Indicated (No Culture) - Urine, Clean Catch [[id number]]  (Abnormal) Collected: 01/15/25 1056    Specimen: Urine, Clean Catch Updated: 01/15/25 1112     Color, UA Yellow     Appearance, UA Clear     pH, UA 5.5     Specific Gravity, UA 1.019     Glucose, UA Negative     Ketones, UA Negative     Bilirubin, UA Negative     Blood, UA Small (1+)     Protein, UA Negative     Leuk Esterase, UA Negative     Nitrite, UA Negative     Urobilinogen, UA 0.2 E.U./dL    hCG, Quantitative, Pregnancy [[id number]] Collected: 01/15/25 1056    Specimen: Blood Updated: 01/15/25 1123     HCG Quantitative <0.50 mIU/mL     Narrative:      HCG Ranges by Gestational Age    Females - non-pregnant premenopausal   </= 1mIU/mL HCG  Females - postmenopausal               </= 7mIU/mL HCG    3 Weeks       5.4   -      72 mIU/mL  4 Weeks      10.2   -     708 mIU/mL  5 Weeks       217   -   8,245  mIU/mL  6 Weeks       152   -  32,177 mIU/mL  7 Weeks     4,059   - 153,767 mIU/mL  8 Weeks    31,366   - 149,094 mIU/mL  9 Weeks    59,109   - 135,901 mIU/mL  10 Weeks   44,186   - 170,409 mIU/mL  12 Weeks   27,107   - 201,615 mIU/mL  14 Weeks   24,302   -  93,646 mIU/mL  15 Weeks   12,540   -  69,747 mIU/mL  16 Weeks    8,904   -  55,332 mIU/mL  17 Weeks    8,240   -  51,793 mIU/mL  18 Weeks    9,649   -  55,271 mIU/mL      CBC Auto Differential [[id number]]  (Abnormal) Collected: 01/15/25 1056    Specimen: Blood Updated: 01/15/25 1104     WBC 12.77 10*3/mm3      RBC 4.91 10*6/mm3      Hemoglobin 13.1 g/dL      Hematocrit 40.9 %      MCV 83.3 fL      MCH 26.7 pg      MCHC 32.0 g/dL      RDW 13.6 %      RDW-SD 41.2 fl      MPV 10.5 fL      Platelets 276 10*3/mm3      Neutrophil % 82.5 %      Lymphocyte % 12.7 %      Monocyte % 3.7 %      Eosinophil % 0.3 %      Basophil % 0.3 %      Immature Grans % 0.5 %      Neutrophils, Absolute 10.54 10*3/mm3      Lymphocytes, Absolute 1.62 10*3/mm3      Monocytes, Absolute 0.47 10*3/mm3      Eosinophils, Absolute 0.04 10*3/mm3      Basophils, Absolute 0.04 10*3/mm3      Immature Grans, Absolute 0.06 10*3/mm3      nRBC 0.0 /100 WBC     Urinalysis, Microscopic Only - Urine, Clean Catch [[id number]]  (Abnormal) Collected: 01/15/25 1056    Specimen: Urine, Clean Catch Updated: 01/15/25 1128     RBC, UA None Seen /HPF      WBC, UA None Seen /HPF      Bacteria, UA None Seen /HPF      Squamous Epithelial Cells, UA 3-6 /HPF      Hyaline Casts, UA None Seen /LPF      Methodology Automated Microscopy             Imaging:    CT Abdomen Pelvis With Contrast    Result Date: 1/15/2025  CT ABDOMEN PELVIS W CONTRAST Date of Exam: 1/15/2025 12:16 PM EST Indication: LLQ abd pain. Comparison: None available. Technique: Axial CT images were obtained of the abdomen and pelvis after the uneventful intravenous administration of iodinated contrast. Reconstructed coronal and sagittal images were also  obtained. Automated exposure control and iterative construction methods were used. Findings: Lung Bases:  The visualized lung bases and lower mediastinal structures are unremarkable. Liver:Liver is normal in size and CT density. No focal lesions. Biliary/Gallbladder: The gallbladder is without evidence of radiopaque stones. The biliary tree is nondilated. Spleen:Spleen is normal in size and CT density. Pancreas: Pancreas shows homogeneous density. There is no evidence of pancreatic mass or peripancreatic fluid. Kidneys: Kidneys are normal in size. There are no stones or hydronephrosis. There are bilateral renal cysts Adrenals: Adrenal glands are unremarkable. Retroperitoneal/Lymph Nodes/Vasculature: No retroperitoneal adenopathy is identified by size criteria. Gastrointestinal/Mesentery: The bowel loops are non-dilated without definite wall thickening or mass. The appendix appears within normal limits. No evidence of obstruction. No free air. Bladder: The bladder is unremarkable No acute abnormalities in the pelvis. The uterus anteverted. Tampon present.   Bony Structures:  Visualized bony structures are consistent with the patient's age.     Impression: 1.No acute intra-abdominal or intrapelvic abnormality identified. Electronically Signed: Abhi Jauregui MD  1/15/2025 12:51 PM EST  Workstation ID: [id number]       Differential Diagnosis and Discussion:    Abdominal Pain: Based on the patient's signs and symptoms, I considered abdominal aortic aneurysm, small bowel obstruction, pancreatitis, acute cholecystitis, acute appendecitis, peptic ulcer disease, gastritis, colitis, endocrine disorders, irritable bowel syndrome and other differential diagnosis an etiology of the patient's abdominal pain.    PROCEDURES:    Labs were collected in the emergency department and all labs were reviewed and interpreted by me.  CT scan was performed in the emergency department and the CT scan radiology impression was interpreted by  me.    No orders to display       Procedures    MDM  Number of Diagnoses or Management Options  Left lower quadrant abdominal pain  Diagnosis management comments: I have explained the patient´s condition, diagnoses and treatment plan based on the information available to me at this time. I have answered questions and addressed any concerns. The patient has a good  understanding of the patient´s diagnosis, condition, and treatment plan as can be expected at this point. The vital signs have been stable. The patient´s condition is stable and appropriate for discharge from the emergency department.      The patient will pursue further outpatient evaluation with the primary care physician or other designated or consulting physician as outlined in the discharge instructions. They are agreeable to this plan of care and follow-up instructions have been explained in detail. The patient has received these instructions in written format and has expressed an understanding of the discharge instructions. The patient is aware that any significant change in condition or worsening of symptoms should prompt an immediate return to this or the closest emergency department or call to 911.         Amount and/or Complexity of Data Reviewed  Decide to obtain previous medical records or to obtain history from someone other than the patient: yes                       Patient Care Considerations:    SEPSIS was considered but is NOT present in the emergency department as SIRS criteria is not present.      Consultants/Shared Management Plan:    None    Social Determinants of Health:    Patient is independent, reliable, and has access to care.       Disposition and Care Coordination:    Discharged: The patient is suitable and stable for discharge with no need for consideration of admission.    I have explained the patient´s condition, diagnoses and treatment plan based on the information available to me at this time. I have answered questions  and addressed any concerns. The patient has a good  understanding of the patient´s diagnosis, condition, and treatment plan as can be expected at this point. The vital signs have been stable. The patient´s condition is stable and appropriate for discharge from the emergency department.      The patient will pursue further outpatient evaluation with the primary care physician or other designated or consulting physician as outlined in the discharge instructions. They are agreeable to this plan of care and follow-up instructions have been explained in detail. The patient has received these instructions in written format and has expressed an understanding of the discharge instructions. The patient is aware that any significant change in condition or worsening of symptoms should prompt an immediate return to this or the closest emergency department or call to 911.  I have explained discharge medications and the need for follow up with the patient/caretakers. This was also printed in the discharge instructions. Patient was discharged with the following medications and follow up:      Medication List        New Prescriptions      dicyclomine 20 MG tablet  Commonly known as: BENTYL  Take 1 tablet by mouth Every 6 (Six) Hours.     ondansetron ODT 4 MG disintegrating tablet  Commonly known as: ZOFRAN-ODT  Place 1 tablet on the tongue Every 8 (Eight) Hours As Needed for Nausea or Vomiting.               Where to Get Your Medications        These medications were sent to [facility] [id number] - [place], [place] - [address] - [phone]  - [phone]  [address], [facility] [address]      Phone: [phone]   dicyclomine 20 MG tablet  ondansetron ODT 4 MG disintegrating tablet      Lizz Guillory, APRN  [address]  [address]  [facility] [address] [phone]    Go to   As needed       Final diagnoses:   Left lower quadrant abdominal pain        ED Disposition       ED Disposition    Discharge    Condition   Stable    Comment   --               This medical record created using voice recognition software.             Moon Barraza, APRN  01/15/25 6205

## 2025-02-11 ENCOUNTER — OFFICE VISIT (OUTPATIENT)
Dept: BEHAVIORAL HEALTH | Facility: CLINIC | Age: 20
End: 2025-02-11
Payer: COMMERCIAL

## 2025-02-11 VITALS
BODY MASS INDEX: 36.01 KG/M2 | HEART RATE: 106 BPM | SYSTOLIC BLOOD PRESSURE: 102 MMHG | HEIGHT: 60 IN | DIASTOLIC BLOOD PRESSURE: 70 MMHG | WEIGHT: 183.4 LBS

## 2025-02-11 DIAGNOSIS — F43.10 POST TRAUMATIC STRESS DISORDER (PTSD): ICD-10-CM

## 2025-02-11 DIAGNOSIS — F12.90 MARIJUANA USE, CONTINUOUS: ICD-10-CM

## 2025-02-11 DIAGNOSIS — Z79.899 MEDICATION MANAGEMENT: ICD-10-CM

## 2025-02-11 DIAGNOSIS — F41.1 GENERALIZED ANXIETY DISORDER: Primary | ICD-10-CM

## 2025-02-11 DIAGNOSIS — F50.811 BINGE EATING DISORDER, MODERATE: ICD-10-CM

## 2025-02-11 DIAGNOSIS — Z91.411: ICD-10-CM

## 2025-02-11 DIAGNOSIS — Z71.89 MEDICATION CARE PLAN DISCUSSED WITH PATIENT: ICD-10-CM

## 2025-02-11 DIAGNOSIS — F33.1 MAJOR DEPRESSIVE DISORDER, RECURRENT EPISODE, MODERATE: ICD-10-CM
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
## 2025-02-11 NOTE — PATIENT INSTRUCTIONS
"1. Should you want to get in touch with your provider, KAYKAY Ponce, please contact MY Medical Assistant, Alondra, directly at [phone].  Recommend saving Alondra's direct number in phone as this is the PREFERRED & EASIEST way to get in contact with your provider.  Please leave a voice mail if you do not get an answer and she will return your call within 24 hrs. You will NOT be able to contact provider on RTF Logict, as Behavioral Health Providers are restricted. YOU MUST CALL [phone]  If you need to speak with the on call provider after hours or on weekends, please Contact the [facility] ([phone]) and staff will be able to page the provider on call directly.     2.  In the event you need to cancel an appointment, please notify the office at least 24 hrs prior:   Contact **Alondra Medical Assistant at [facility] directly at [phone] or the [facility] ([phone])     3. MEDICATION REFILLS:  PLEASE CALL THE PHARMACY TO REQUEST ALL MEDICATION REFILLS or via [employer] TO ENSURE YOU ARE RECEIVING YOUR MEDICATIONS IN A TIMELY MANNER. The pharmacy or [employer] [facility] will send this request ELECTRONICALLY to the ordering provider.   IF YOU USE AN AUTOMATED SERVICE AT THE PHARMACY FOR REFILLS AND ARE TOLD THERE ARE \"NO REFILLS REMAINING\"   PLEASE CALL THE PHARMACY & SPEAK TO A LIVE PERSON TO VERIFY IT IS THE MOST UP TO DATE PRESCRIPTION ON FILE.    All new prescriptions will have a different number, therefore, if you were given refills for a medication today or at last visit it will not have the same number as the previous prescription.     Going forward any medications for your mental health including any previously prescribed by your primary care provider will now be managed by KAYKAY Ponce. Contact provider's MA INITIALLY when down to 5-7 days remaining to ensure new refill(s) will have this provider name on prescription for future refills.  If requested from current bottle, via " "mychart, or via pharmacy the request would be directed to that ordering provider. And to prevent confusion, inaccurate dosing, inaccurate medication refills, the management of psychotropic and/or mental health medications should only be ordered by this mental health provider.    4.  In the event you have personal crisis, contact the following crisis numbers: Suicide Prevention Hotline 1-974.418.5247 or *988, LISETTE Helpline 3-486-297-LISETTE; [facility] Emergency Room [phone]; text HELLO to 594288; or 911.  If you feel like harming yourself or others, call 911 right away.  You can call the [facility] Suicide and Crisis Lifeline at  988   to speak with a counselor at the lifeline, or you can connect with one using their online chat  .    5.  Never stop an antidepressant medicine without first talking to a healthcare provider. Suddenly stopping this type of medication can cause withdrawal symptoms.    6.  Counseling and talk therapy  Counseling or therapy teaches you new coping skills and more adaptive ways of thinking about problems. These tools can help you make positive changes. The benefits of counseling often last long after treatment sessions have stopped.    7.   We would appreciate your feedback, please scan the QRS code on the back of your appointment card (or see below) and complete a brief survey.  [place] location is still not available, so please click \"[place]\" location.  Thank you     8. NO SHOW POLICY/SAME DAY CANCEL POLICY:  We understand unexpected circumstances arise; however, anytime you miss your appointment we are unable to provide you appropriate care.  In addition, each appointment missed could have been used to provide care for others.  We ask that you call at least 24 hours in advance to cancel or reschedule an appointment. We would like to take this opportunity to remind you of our policy stating patients who miss THREE or more appointments without cancelling or rescheduling 24 " "hours in advance of the appointment may be subject to cancellation of any further visits with our clinic and recommendation to seek in-person services/visits. Please call [phone] to reschedule your appointment. If there are reasons that make it difficult for you to keep the appointments, please call and let us know how we can help. Please understand that medication prescribing will not continue without seeing your provider.        SPECIFIC RECOMMENDATIONS:     1.      Medications discussed at this encounter:                   -  no changes    2.      Psychotherapy recommendations: Referral [facility] with Juana Templeton LCSW, patient informed provider is located in [facility] on the 2nd floor. Office will call to schedule appointment.  Patient given direct contact number to call if have not received a call to schedule within 1 week, [phone].      3.     Return to clinic: 2 weeks Thurs. 2/27/25 at 1pm    4.  Coping mechanisms discussed with patient today as a way to gain control over feelings to prevent situation or panic attack from getting worse: grounding techniques using 5 senses (name 3 things you can see, smell, touch, etc.), slow paced breathing techniques- focus on door inhaling and exhaling at each corner, application of cold compress/ice pack/water on face or opening freezer door to allow cold air to be breathed in taking slow deep breaths, closing eyes and focus on positive thoughts.     Please arrive at least 15 minutes before your scheduled appointment time to complete check in process.      IF you are scheduled for a Ayi Lailehart VIDEO visit, YOU MUST COMPLETE THE \"E-CHECK IN\" PROCESS PRIOR TO BEGINNING THE VISIT, You may still complete the E-Check in for in office visits prior to appointment, you will receive multiple text/email reminders which will direct you further if needed.            "
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
## 2025-02-20 ENCOUNTER — OFFICE VISIT (OUTPATIENT)
Age: 20
End: 2025-02-20
Payer: COMMERCIAL

## 2025-02-20 DIAGNOSIS — F43.10 POST TRAUMATIC STRESS DISORDER (PTSD): ICD-10-CM

## 2025-02-20 DIAGNOSIS — Z91.411: ICD-10-CM

## 2025-02-20 DIAGNOSIS — F41.1 GENERALIZED ANXIETY DISORDER: Primary | ICD-10-CM

## 2025-02-20 DIAGNOSIS — F50.811 BINGE EATING DISORDER, MODERATE: ICD-10-CM

## 2025-02-20 DIAGNOSIS — F33.1 MAJOR DEPRESSIVE DISORDER, RECURRENT EPISODE, MODERATE: ICD-10-CM

## 2025-02-27 ENCOUNTER — OFFICE VISIT (OUTPATIENT)
Dept: BEHAVIORAL HEALTH | Facility: CLINIC | Age: 20
End: 2025-02-27
Payer: COMMERCIAL

## 2025-02-27 VITALS
WEIGHT: 182.6 LBS | BODY MASS INDEX: 35.85 KG/M2 | SYSTOLIC BLOOD PRESSURE: 122 MMHG | DIASTOLIC BLOOD PRESSURE: 80 MMHG | HEIGHT: 60 IN | HEART RATE: 98 BPM

## 2025-02-27 DIAGNOSIS — Z79.899 MEDICATION MANAGEMENT: ICD-10-CM

## 2025-02-27 DIAGNOSIS — F12.90 MARIJUANA USE, CONTINUOUS: ICD-10-CM

## 2025-02-27 DIAGNOSIS — Z71.89 MEDICATION CARE PLAN DISCUSSED WITH PATIENT: ICD-10-CM

## 2025-02-27 DIAGNOSIS — F41.1 GENERALIZED ANXIETY DISORDER: ICD-10-CM

## 2025-02-27 DIAGNOSIS — F33.1 MAJOR DEPRESSIVE DISORDER, RECURRENT EPISODE, MODERATE: ICD-10-CM

## 2025-02-27 DIAGNOSIS — F90.2 ATTENTION DEFICIT HYPERACTIVITY DISORDER, COMBINED TYPE: Primary | ICD-10-CM

## 2025-02-27 DIAGNOSIS — Z13.39 ADHD (ATTENTION DEFICIT HYPERACTIVITY DISORDER) EVALUATION: ICD-10-CM

## 2025-02-27 RX ORDER — BUPROPION HYDROCHLORIDE 150 MG/1
150 TABLET ORAL EVERY MORNING
Qty: 30 TABLET | Refills: 1 | Status: SHIPPED | OUTPATIENT
Start: 2025-02-28

## 2025-02-27 RX ORDER — BUPROPION HYDROCHLORIDE 300 MG/1
300 TABLET ORAL EVERY MORNING
Qty: 90 TABLET | Refills: 0 | Status: SHIPPED | OUTPATIENT
Start: 2025-02-27

## 2025-02-27 NOTE — PROGRESS NOTES
"Subjective   Brenda Garcia is a 19 y.o. female who presents today for follow up    Referring Provider:  No referring provider defined for this encounter.    Chief Complaint:  ADHD combined type, anxiety, depression, ADHD evaluation, continuous marijuana use, medication management, medication care plan discussed    Answers submitted by the patient for this visit:  Anxiety (Submitted on 2025)  Chief Complaint: Anxiety  Visit: follow-up  Frequency: constantly  Severity: severe  depressed mood: Yes  dry mouth: No  excessive worry: Yes  insomnia: Yes  irritability: Yes  malaise/fatigue: No  obsessions: Yes  Sleep quality: fair  Hours of sleep per night: 8 Hours  Aggravated by: family issues  Medication compliance: %  Side effects: nausea    History of Present Illness:    25:  Patient presents today in office for further assessment of overlapping symptoms presented at last visit.     Patient does admit to struggling with anxiety the most, which further makes other things worse.    Spending excess amount of money for 2 weeks, then later tells herself to snap out of it, and will do good then it comes back. One day will decide to go shopping or go online and buy without thinking, doesn't realize the purchase was not needed until she see's her bank account. Uses linked credit card to [employer] or if it is a big purchase, though sometimes will use [employer] for the cash back percentage on other purchases. Will find self during this time speeding but no higher than  mph.  Patient had lost drivers license and got it renewed and is more cautious about ensuring she is following the rules of the road, as patient has old drivers license which is , other times during these episodes feels she can get away with getting pulled over, do whatever you want you will be fine, don't think about the consequences.\"Plans to go to [facility] soon.  Denies sleep deprivation, has more energy while out doing things, though " "loves to sleep.  No one has told her she was out of character during that time.  Feels boyfriend would recognize, and has said stuff before about spending money he will ask do you know how much your spending.  Patient does pay bills on time, as patient gets anxious about bills fears she would forget to pay which would cause disappointment.     Impulsivity:   At work used to do internet orders, though would overwhelm self with the steps to do the job, \"I should have got a cart first, forgets basic steps, thinks about I should have done x,y, z first and thought about that first.\"  Food impulsively eats something, and didn't think about other meals, not planned out accordingly.    Easily distracted, often forgets what she is doing, takes awhile to get back on track with several things.     Last year took an abnormal psychology class and felt she met criteria for adhd or bipolar 2 and told mom, and needed to schedule an appointment. Best friend had asked if she had ever been tested for ADHD.     ADHD:   Elementary school:   Grades: struggled with math, and really struggled 5th grade and thereafter, got first C that year and had to do school on Saturday; C's maybe a B once in math  Special classes or failures: No (only in college)  Got in trouble: No  Referral for ADHD testing: No \"they just thought I used all that energy on sports, went through several sports, soccer. everyone just called me loud and hyper, just a kid being a kid.\"  Fhx: none known, though suspects sister and cousin-family doesn't believe   Presently:  Problems with attention to detail: Yes (frequently submits incomplete work, goes back to fix mistakes, overlooks or misses details, work is inaccurate) was also difficult during school  Problems with sustained attention: Yes (Difficulty remaining focused during lectures-did record a few lectures pearl year of college due to great difficulty, day dreams a lot, would try to force self to listen, " "conversations, lengthy reading-did some in class if peaked interest, would try though as reading unable to understand what she read  Problems listening when spoken to directly: Yes (mind seems elsewhere, even in the absences of any obvious distraction)   Failure to finish tasks: Yes happens a lot at work and home, will start on task intended, then original task never completed, and dad will text her telling her once again you forgot to complete.  Avoids/Dislikes/Reluctant to engage in tasks that require sustained mental effort: Yes (schoolwork, homework,preparing reports,completing forms, reviewing lengthy papers) procrastinates   Easily distracted: Yes (unrelated thoughts, external factors) day dreams often  Forgetting things: Yes (daily chores, running errands, returning calls,, keeping appointments) able to remember to pay bills timely; went to work this morning and was not scheduled despite following routine to go to work while clocking in she received a message asking if she was still wanting to clock in as patient was not on the schedule; forgot to turn off car and in store for 1.5 hrs and thought she lost her keys-had several people looking and found the keys in car while vehicle still running-which had a regular key in the ignition.   Losing things: Yes (school materials, student ID-5-6 times, wallets, keys, cell phone, last year she got a new bank card 9 times, has lost drivers license multiple times)  Hard to organize: Yes difficulty managing sequential tasks, keeping materials and belongings in order, messy, disorganized work, poor time management, fails to meet deadlines at times sometimes missed assignments, if boss at work gave deadline is able to get it done but if gave self a deadline will not meet, keeps pushing it off, poor planning, prioritizing)   Talks a lot and cutting people off:Yes \"I talk so much, its all I really do, I used to cut people off, but when I was younger I learned it was rude, I " "actively try to make an effort to not cut people off.\" Voted most  talkative senior yr of [employer]  Drifts off during conversations: Yes  Difficulty with Reading: Yes, has tried audio books though finds it boring, re-reads  Xiii. Difficulty watching TV/Movies: Yes, easily distracted and bored, needs to do another task to listen to a show or movie- will be coloring while watching otherwise does not follow what is going on and has to rewind, turn on captions.    Several inattentive or hyperactive-impulsive symptoms are present in 2 or more settings (home,work, school, with friends or relatives, in other activities).  Clear evidence the symptoms interfere with, or reduce the quality of, social, academic, or occupational functioning.        2/11/25:  INITIAL EVAL  Answers submitted by the patient for this visit:  Depression (Submitted on 2/9/2025)  Chief Complaint: Depression  Visit: initial  Onset: 1 to 5 years ago  Progression since onset: worse  Frequency: constantly  Severity: severe  excessive worry: Yes  insomnia: Yes  irritability: Yes  malaise/fatigue: Yes  obsessions: Yes  hypersomnia: Yes  difficulty controlling mood: Yes  difficulty staying asleep: Yes  difficulty falling asleep: Yes  Hours of sleep per night: 6 Hours  Aggravated by: nothing, family issues, social activities    Provider introduced self, as well as credentials, and informed of provider role which will primarily include medication management of mental health conditions. Explained the difference between provider role vs. therapy/counseling services which include CBT (talk therapy) and do not include management of medications which are typically 45 minutes to 1 hr in length, however, if patient was in agreement to start therapy this provider can provide a contact list and/or refer. Patient verbalized understanding and agreed to proceed.    Patient presents today in office with a history of anxiety and depression for which treatment began in the " "6th grade.  Patient is currently prescribed Wellbutrin  mg every morning ( mg twice daily 2/2022-6/2023, then changed to  mg formulation) and Lexapro 20 mg every morning (9/17/24 10 mg, increased to 20 mg 11/6/24), which have provided effectiveness, though feels medications are not as effective as they were previously.  Patient denies significant PMHX.    Patient goal of treatment \"I want to get into a mindset where I wake up everyday, and don't think I am running out time, I want my mind to stop, peace and tranquility.\"    Patient has noticed physical symptoms of anxiety- shaky, sweaty, stutters, nausea, vomits. And will also find self getting up in the middle of the night due to not eating much during the day and binge eating. Denies purging behavior.    Anxiety: goes to work, to bed on time, getting up on time. Patient has tried to focus on a healthier diet, though it stresses her out, then ends up binging. Patient enjoys job at [employer], gets to work with boyfriend and best friend, though doesn't understand why she gets so anxious.  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  When patient is at [employer] house will shut self in room and avoids going out to open areas by herself as his parents are in the home.  Patient has been with boyfriend for over 2 yrs and reports his family has treated her with respect, loving family whom have welcomed patient into their lives. They have went on vacations, family gatherings, and feels she should be comfortable eating around them, helping herself to fridge, etc while in their home, but isn't.    Symptoms started approximately her sophomore year of college, 2.5 yrs ago. Patient lived on campus at [facility] and was excited about going. 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  I think he " "really messed me up, he did a number on my self worth.\" He was from [facility], his name is Kit, and his beauty standards are \"tiny women\", a month after they stared dating would say, \"your gonna wear that or eat that, and stop eating a week before we go to his mom's.\" He was patient first official relationship. \"I gave him my first everything.\" Explains his mother would say similar things, and felt judged on how much she ate or what she wore.  Whiting break up, as patient started realizing she was not happy. Best friend would question why patient would allow him and his mother to speak to her in such a manner. \"I was scared to break up with him.\"    Scared of being judged. Especially around food, always go back to food, boyfriend name is Gallo. Will hide bag of food, drinks or he will carry food. Will not eat in front of his parents, will take 2 bites and then feels sick at her stomach.    Bed weight of 194 lbs was at the ED and bed may have not been zeroed out prior to weight due to fluctuation value noted.   Wt Readings from Last 3 Encounters:   02/11/25 83.2 kg (183 lb 6.4 oz)   01/15/25 88.3 kg (194 lb 10.7 oz)   11/06/24 82.3 kg (181 lb 6.4 oz)     \"Physical feeling in brain not feeling fine for so long, something off.  Spending-I usually am a really anxious spender, but recently I want this and I will get this without 2nd thought, I am not saving money at all. Thoughts I feel I would never have, why am I here, too scared to kill self; feel life is too short; do you really have enough time to do that. Everyday is constantly going back and forth with self in head. Self doubt. Simple tasks-prioritizing, then hasn't done anything productive. Loses sense of time. Forget a lot more than used too. Eye doctor appointment I thought it was yesterday but its 21st.\" Symptoms present for almost 2 yrs.     If others don't use turn signal while driving, if stuff isn't done the way it is supposed to be done then this bad thing " "will happen. I've only felt this way for the last 2.5 yrs. I feel I am always weighed down, I can think of things Kit said and I am instantly sick and will shut down.\"     \"Lexapro has helped, but also feels it not like it used to and now I am back at square 1. Routine is the same. Not doing what it should be doing. Possible ADHD?\"    Psych: Eating Disorder The patient admits to binge eating episodes, described as occurring nearly everyday, eating excessive amount of calories at one time in secret .    PTSD:  Persistent and exaggerated negative beliefs or expectations about oneself, others, or the world (e.g., \"I am bad,\" \"No one can be trusted,\" \"The world is completely dangerous,\" \"My whole nervous system is permanently ruined\"), Persistent, distorted cognitions about the cause or consequences of the traumatic event(s) that lead the individual to blame himself/herself or others, Persistent negative emotional state (e.g., fear, horror, anger, guilt, or shame), Markedly diminished interest or participation in significant activities, and Persistent inability to experience positive emotions (e.g., inability to experience happiness, satisfaction, or loving feelings)  Intrusive thoughts of trauma, Triggers present, and Intense psychologic distress    Depression: Patient complains of depression. She complains of anhedonia, depressed mood, difficulty concentrating, fatigue, feelings of worthlessness/guilt, hopelessness, and insomnia     Anxiety:  The patient endorses to the following symptoms of anxiety including: excessive anxiety and worry about a number of events or activities for more days than not, restlessness or feeling keyed up, being easily fatigued, difficulty concentrating or mind going blank, irritability, and sleep disturbance which have caused impairment in important areas of daily functioning.      PHQ-9 Depression Screening  PHQ-9 Total Score:  2/9/2025 23   The PHQ has not been completed during this " encounter.          BETI-7   2/9/2025 21    The following portions of the patient's history were reviewed and updated as appropriate: allergies, current medications, past family history, past medical history, past social history, and past surgical history.     Past Surgical History:  Past Surgical History:   Procedure Laterality Date    WISDOM TOOTH EXTRACTION Bilateral        Problem List:  Patient Active Problem List   Diagnosis    Anxiety and depression    Encounter for surveillance of vaginal ring hormonal contraceptive device    Screening for viral disease    Immunization due       Allergy:   No Known Allergies     Discontinued Medications:  Medications Discontinued During This Encounter   Medication Reason    buPROPion XL (WELLBUTRIN XL) 300 MG 24 hr tablet Dose adjustment       Current Medications:   Current Outpatient Medications   Medication Sig Dispense Refill    buPROPion XL (WELLBUTRIN XL) 300 MG 24 hr tablet Take 1 tablet by mouth Every Morning. TAKE WITH 150 MG TO EQUAL 450 MG TOTAL  Indications: Attention Deficit Hyperactivity Disorder, Major Depressive Disorder 90 tablet 0    dicyclomine (BENTYL) 20 MG tablet Take 1 tablet by mouth Every 6 (Six) Hours. 20 tablet 0    escitalopram (Lexapro) 20 MG tablet Take 1 tablet by mouth Daily. 30 tablet 2    etonogestrel-ethinyl estradiol (EnilloRing) 0.12-0.015 MG/24HR vaginal ring Insert vaginally and leave in place for 3 consecutive weeks, then remove for 1 week. 9 each 0    ondansetron ODT (ZOFRAN-ODT) 4 MG disintegrating tablet Place 1 tablet on the tongue Every 8 (Eight) Hours As Needed for Nausea or Vomiting. 15 tablet 0    pantoprazole (PROTONIX) 40 MG EC tablet Take 1 tablet by mouth Daily. 90 tablet 1    buPROPion XL (Wellbutrin XL) 150 MG 24 hr tablet Take 1 tablet by mouth Every Morning. TAKE WITH 300 mg TO EQUAL 450 mg total  Indications: Attention Deficit Hyperactivity Disorder, Major Depressive Disorder 30 tablet 1     No current  "facility-administered medications for this visit.       Past Medical History:  Past Medical History:   Diagnosis Date    Anxiety     Depression     Self-injurious behavior        Past Psychiatric History:  Began Treatment: 6th grade  Diagnoses:Depression and Anxiety  Psychiatrist:Cristo  Therapist: in highschool  Admission History:Denies  Medication Trials: Wellbutrin  mg twice daily 2022-2023 due to keeping up with 2 times daily dosing,then switched to  mg    Self Harm:  \"6th grade one time, I was depressed and remember I watched a [employer] video and she said it helped\"    Suicide Attempts:Denies   Psychosis, Anxiety, Depression:  n/a    Substance Abuse History: As of 23  Types: smokes marijuana daily, vapes, flour, and edibles  Withdrawal Symptoms:Denies  Longest Period Sober:Not Applicable   AA: Not applicable   Legal: none    Social History: As of 23  Martial Status:Boyfriend   Employed:Yes and If so, where [employer] part time  Kids:No  House:Lives in a house with parents and brother & sister   History: Denies  Access to Guns:  Yes, used to be locked but cleaning currently-old shot gun, chace guns    Social History     Socioeconomic History    Marital status: Significant Other    Number of children: 0    Highest education level: Bachelor's degree (e.g., BA, AB, BS)   Tobacco Use    Smoking status: Never     Passive exposure: Past    Smokeless tobacco: Never   Vaping Use    Vaping status: Never Used   Substance and Sexual Activity    Alcohol use: Yes     Comment: socially    Drug use: Yes     Types: Marijuana     Comment: daily    Sexual activity: Yes       Family History:   Suicide Attempts: Denies  Suicide Completions:Denies      Family History   Problem Relation Age of Onset    Depression Mother     Anxiety disorder Mother     Depression Sister     Anxiety disorder Sister     Depression Brother     Anxiety disorder Brother     Depression Maternal Grandmother     " "Dementia Paternal Great-Grandmother        Developmental History:   Born: [place]  Siblings:2- 1 brother and sister (patient is the oldest)  Childhood: Denies Abuse  High School:Completed  College: Bachelors in general studies, 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   Cardiovascular:  Negative for palpitations.   Neurological:  Negative for dizziness and seizures.   Psychiatric/Behavioral:  Positive for decreased concentration and sleep disturbance. Negative for confusion, self-injury and suicidal ideas. The patient is nervous/anxious. The patient is not hyperactive.          Physical Exam:  Physical Exam  Psychiatric:         Attention and Perception: Attention and perception normal.         Mood and Affect: Mood is anxious and depressed.         Speech: Speech normal.         Behavior: Behavior normal. Behavior is cooperative.         Thought Content: Thought content normal. Thought content does not include suicidal ideation. Thought content does not include suicidal plan.         Cognition and Memory: Cognition and memory normal.         Judgment: Judgment normal.         Vital Signs:   /80   Pulse 98   Ht 152.4 cm (60\")   Wt 82.8 kg (182 lb 9.6 oz)   BMI 35.66 kg/m²      Lab Results:   Admission on 01/15/2025, Discharged on 01/15/2025   Component Date Value Ref Range Status    Glucose 01/15/2025 105 (H)  65 - 99 mg/dL Final    BUN " 01/15/2025 6  6 - 20 mg/dL Final    Creatinine 01/15/2025 0.60  0.57 - 1.00 mg/dL Final    Sodium 01/15/2025 137  136 - 145 mmol/L Final    Potassium 01/15/2025 3.6  3.5 - 5.2 mmol/L Final    Chloride 01/15/2025 105  98 - 107 mmol/L Final    CO2 01/15/2025 18.8 (L)  22.0 - 29.0 mmol/L Final    Calcium 01/15/2025 8.8  8.6 - 10.5 mg/dL Final    Total Protein 01/15/2025 7.6  6.0 - 8.5 g/dL Final    Albumin 01/15/2025 4.1  3.5 - 5.2 g/dL Final    ALT (SGPT) 01/15/2025 22  1 - 33 U/L Final    AST (SGOT) 01/15/2025 22  1 - 32 U/L Final    Alkaline Phosphatase 01/15/2025 58  39 - 117 U/L Final    Total Bilirubin 01/15/2025 0.4  0.0 - 1.2 mg/dL Final    Globulin 01/15/2025 3.5  gm/dL Final    A/G Ratio 01/15/2025 1.2  g/dL Final    BUN/Creatinine Ratio 01/15/2025 10.0  7.0 - 25.0 Final    Anion Gap 01/15/2025 13.2  5.0 - 15.0 mmol/L Final    eGFR 01/15/2025 131.2  >60.0 mL/min/1.73 Final    Lipase 01/15/2025 29  13 - 60 U/L Final    Color, UA 01/15/2025 Yellow  Yellow, Straw Final    Appearance, UA 01/15/2025 Clear  Clear Final    pH, UA 01/15/2025 5.5  5.0 - 8.0 Final    Specific Gravity, UA 01/15/2025 1.019  1.005 - 1.030 Final    Glucose, UA 01/15/2025 Negative  Negative Final    Ketones, UA 01/15/2025 Negative  Negative Final    Bilirubin, UA 01/15/2025 Negative  Negative Final    Blood, UA 01/15/2025 Small (1+) (A)  Negative Final    Protein, UA 01/15/2025 Negative  Negative Final    Leuk Esterase, UA 01/15/2025 Negative  Negative Final    Nitrite, UA 01/15/2025 Negative  Negative Final    Urobilinogen, UA 01/15/2025 0.2 E.U./dL  0.2 - 1.0 E.U./dL Final    HCG Quantitative 01/15/2025 <0.50  mIU/mL Final    Extra Tube 01/15/2025 Hold for add-ons.   Final    Auto resulted.    Extra Tube 01/15/2025 hold for add-on   Final    Auto resulted    Extra Tube 01/15/2025 Hold for add-ons.   Final    Auto resulted.    Extra Tube 01/15/2025 Hold for add-ons.   Final    Auto resulted    WBC 01/15/2025 12.77 (H)  3.40 - 10.80  10*3/mm3 Final    RBC 01/15/2025 4.91  3.77 - 5.28 10*6/mm3 Final    Hemoglobin 01/15/2025 13.1  12.0 - 15.9 g/dL Final    Hematocrit 01/15/2025 40.9  34.0 - 46.6 % Final    MCV 01/15/2025 83.3  79.0 - 97.0 fL Final    MCH 01/15/2025 26.7  26.6 - 33.0 pg Final    MCHC 01/15/2025 32.0  31.5 - 35.7 g/dL Final    RDW 01/15/2025 13.6  12.3 - 15.4 % Final    RDW-SD 01/15/2025 41.2  37.0 - 54.0 fl Final    MPV 01/15/2025 10.5  6.0 - 12.0 fL Final    Platelets 01/15/2025 276  140 - 450 10*3/mm3 Final    Neutrophil % 01/15/2025 82.5 (H)  42.7 - 76.0 % Final    Lymphocyte % 01/15/2025 12.7 (L)  19.6 - 45.3 % Final    Monocyte % 01/15/2025 3.7 (L)  5.0 - 12.0 % Final    Eosinophil % 01/15/2025 0.3  0.3 - 6.2 % Final    Basophil % 01/15/2025 0.3  0.0 - 1.5 % Final    Immature Grans % 01/15/2025 0.5  0.0 - 0.5 % Final    Neutrophils, Absolute 01/15/2025 10.54 (H)  1.70 - 7.00 10*3/mm3 Final    Lymphocytes, Absolute 01/15/2025 1.62  0.70 - 3.10 10*3/mm3 Final    Monocytes, Absolute 01/15/2025 0.47  0.10 - 0.90 10*3/mm3 Final    Eosinophils, Absolute 01/15/2025 0.04  0.00 - 0.40 10*3/mm3 Final    Basophils, Absolute 01/15/2025 0.04  0.00 - 0.20 10*3/mm3 Final    Immature Grans, Absolute 01/15/2025 0.06 (H)  0.00 - 0.05 10*3/mm3 Final    nRBC 01/15/2025 0.0  0.0 - 0.2 /100 WBC Final    RBC, UA 01/15/2025 None Seen  None Seen, 0-2 /HPF Final    WBC, UA 01/15/2025 None Seen  None Seen, 0-2 /HPF Final    Bacteria, UA 01/15/2025 None Seen  None Seen /HPF Final    Squamous Epithelial Cells, UA 01/15/2025 3-6 (A)  None Seen, 0-2 /HPF Final    Hyaline Casts, UA 01/15/2025 None Seen  None Seen /LPF Final    Methodology 01/15/2025 Automated Microscopy   Final       EKG Results:  No orders to display       Imaging Results:  CT Abdomen Pelvis With Contrast    Result Date: 1/15/2025  Impression: 1.No acute intra-abdominal or intrapelvic abnormality identified. Electronically Signed: Abhi Jauregui MD  1/15/2025 12:51 PM EST  Workstation ID:  [id number]        Assessment & Plan   Diagnoses and all orders for this visit:    1. Attention deficit hyperactivity disorder, combined type (Primary)  -     buPROPion XL (Wellbutrin XL) 150 MG 24 hr tablet; Take 1 tablet by mouth Every Morning. TAKE WITH 300 mg TO EQUAL 450 mg total  Indications: Attention Deficit Hyperactivity Disorder, Major Depressive Disorder  Dispense: 30 tablet; Refill: 1  -     buPROPion XL (WELLBUTRIN XL) 300 MG 24 hr tablet; Take 1 tablet by mouth Every Morning. TAKE WITH 150 MG TO EQUAL 450 MG TOTAL  Indications: Attention Deficit Hyperactivity Disorder, Major Depressive Disorder  Dispense: 90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

## 2025-02-27 NOTE — PATIENT INSTRUCTIONS
"Should you want to get in touch with your provider, KAYKAY Ponce, please contact MY Medical Assistant, Alondra, directly at [phone].  Recommend saving Alondra's direct number in phone as this is the PREFERRED & EASIEST way to get in contact with your provider.  Please leave a voice mail if you do not get an answer and she will return your call within 24 hrs. You will NOT be able to contact provider on [facility], as Behavioral Health Providers are restricted. YOU MUST CALL [phone]  If you need to speak with the on call provider after hours or on weekends, please Contact the [facility] ([phone]) and staff will be able to page the provider on call directly.     SPECIFIC RECOMMENDATIONS:     1.      Medications discussed at this encounter:                   -  Refilled Wellbutrin  mg AND START TAKING 150 mg with the 300 mg to equal 450 mg daily in the morning.     2.      Psychotherapy recommendations: Continue with current therapist.      3.     Return to clinic: 6 weeks Friday 4/11/25 at 10am    [email or web address] and How to ADHD [employer] channel- look for the pink and blue cartoon brain, she usually starts her intro as \"Hello Brains\" very helpful     Advised patient to watch video links about \"The Wall of Awful\" the emotional barrier between you and tasks And how to get through the Wall in a healthy way.   https://youtu.be/Hu90cQ077Br    [email or web address].be/[email or web address]    ADHD strategies/tips to help manage symptoms, with examples, which can be helpful in day to day life.  Remember health care professionals and medications can help manage symptoms, but they can only do so much.  You're the one who can make the most difference in overcoming them, everyone is different, and some of these will hopefully work    1) Use of visual reminders (sit empty box of kleenex near items you take for the day-work bag, keys, purse)     2)  Use of lists (short list for the week or day you want " to achieve), color coding, manage forgetfulness by writing things down.     3)  Use multiple methods for reminders (calendar in phone with alarms, alarm alerts, write down on calendar, sticky notes, write down key words when a forgotten item comes to mind so you don't forget later)    4)  Learn to break tasks down into smaller steps and follow a systemic approach to organization. Realize some tasks can contain multiple tasks such as:  cleaning the kitchen is not just 1 simple task, it usually includes multiple tasks (washing dishes, putting away dishes, cleaning off counters, wiping out microwave, sweeping, mopping, etc)    5)  Minimize distractions that require a higher level of concentration.      6)  Use timers for those lengthy tasks to keep on task (such as using a timer for reading for 10 minutes, or when you find yourself easily distracted by other house hold or work tasks)     7)  Establish a routine    8)  Deal with mail on a daily basis so it doesn't accumulate and you don't miss some important notification.    9)  Use Auto-Pay or Auto-Reminders for Bill Pay.  Go Paperless.    10)  Designate specific spaces for daily used items:  keys, purse, wallet, phone, bills     11)  Deal with it NOW.  Avoid forgetfulness, clutter, and procrastination by filing papers, cleaning up messes, returning calls or texts immediately, not some time in the future. If a task can be done in 2 minutes or less, do it on the spot, rather than putting it off for later.    12)  Give yourself more time than you need.  Plan to be early, give yourself 10 more minutes for every 30 minutes you estimate to complete a task, get somewhere.  Set reminder alarms to ensure you leave on time and make sure you have everything you need ahead of time so you're not frantically looking for your keys or phone when it's time to leave.     13)  Learn to say NO.  Impulsiveness can lead you to agree to projects, tasks, or making social engagements leaving  "you feeling overwhelmed, overtired, and affect the quality of your work.  Saying no to certain commitments may improve your ability to accomplish tasks, keep social dates, and live a healthier lifestyle.  Check your schedule first before agreeing.     14)  \"PIN\" Text messages to remind you to reply later, or to pay that bill reminder, etc.     15)  Physical activity is the best way to redirect your attention, when you find you are having difficulty paying attention, easily distracted TRY to do 10 minutes of physical exercise- 25 jumping jacks, running, running up and down the stairs 2-3 times.     16)  Find ways to redirect focus and attention with breathing exercises, counting, box breathing using a door or an object with 4 corners.         Please arrive at least 15 minutes before your scheduled appointment time to complete check in process.      IF you are scheduled for a [facility] V[facility] visit, YOU MUST COMPLETE THE \"E-CHECK IN\" PROCESS PRIOR TO BEGINNING THE VISIT, You may still complete the E-Check in for in office visits prior to appointment, you will receive multiple text/email reminders which will direct you further if needed.            "

## 2025-02-28 NOTE — PROGRESS NOTES
"Progress Note    Date: 03/06/2025  Time In: 11:00  Time Out: 12:00    Patient Legal Name: Brenda Garcia  Patient Age: 19 y.o.    Mode of visit: In person  Location of provider: [address], [place] [address]  Location of patient: Office      CHIEF COMPLAINT: anxiety, ptsd, depression, binge-eating     Subjective   History of Present Illness   Brenda \"Stephanie\" is a 19 y.o. female who presents today as a follow-up for continued psychotherapy. Patient reported she has started her new medications.  Patient stated she has been taking melatonin to help fall asleep but noted she wakes up a few times in the night.  Patient shared being triggered into anxiety and panic by her father's text messages. Patient stated she did have a panic attack after one of her dad's text rants and was able to use positive self-talk to pull herself out of it.  Patient advised she has been paying attention to events that precipitate her binge eating and has recognized that things happened that did upset her on those days.  Patient reported sometimes feeling she needs to eat food before someone else in th house does to make sure she gets some of it.  Patient shared that there have been times where she was eating and felt sick but could not stop eating. Patient requested information about ADHD today.  Patient is voluntarily requesting to participate in outpatient therapy at [facility].       History obtained from referring provider's note on 2/11/25:  Past Psychiatric History:  Began Treatment: 6th grade  Diagnoses:Depression and Anxiety  Psychiatrist:Denies  Therapist: in highschool  Admission History:Denies  Medication Trials: Wellbutrin  mg twice daily 2/2022-6/2023 due to keeping up with 2 times daily dosing,then switched to  mg    Self Harm:  \"6th grade one time, I was depressed and remember I watched a [employer] video and she said it helped\"    Suicide " Attempts:Denies   Psychosis, Anxiety, Depression:  n/a    Assessment    Mental Status Exam     Appearance: good hygiene and dressed appropriately for the weather  Behavior: calm  Cooperation:  engaged, cooperative, attentive, and friendly  Eye Contact:  good  Affect:  bright  Mood: expressive and anxious  Speech: talkative  Thought Process:  organized  Thought Content: appropriate  Suicidal: denies  Homicidal:  denies  Hallucinations:  denies  Memory:  intact  Orientation:  person, place, time, and situation  Reliability:  reliable  Insight:  good  Judgment:  good    Clinical Intervention       ICD-10-CM ICD-9-CM   1. Generalized anxiety disorder  F41.1 300.02   2. Binge eating disorder, moderate  F50.811 307.59   3. Major depressive disorder, recurrent episode, moderate  F33.1 296.32   4. Attention deficit hyperactivity disorder, combined type  F90.2 314.01        Individual psychotherapy was provided utilizing CBT and person-centered techniques to provide symptom relief, build rapport, encourage expression of thoughts and feelings, support self-esteem, establish new coping skills, identify triggers, recognize patterns of behavior, acknowledge sources of feelings and behaviors, identify strengths, build confidence, assess symptoms, gather history, provide psychoeducation, and provide rationale for treatment. Therapist provided psychoeducation on the cognitive behavioral model and seeking/acknowledging positive experiences and thoughts to improve overall mood.  Therapist utilized open-ended questions to encourage the development of a positive therapeutic relationship and open communication.  Therapist normalized/validated patient’s thoughts and feelings as appropriate. Gave patient 478 breathing a and progressive muscle relaxation handouts to help prepare for sleep. Discussed setting realistic expectations in situations with her father and others to help reduce the frustration of them not meeting her high  expectations. Began reviewing the binge eating disorder module given at last session and she identified a couple of things that trigger her eating. Provided psychoeducation on the cycle of binge eating.  Provided patient with more binge-eating information from [employer] to review for next session. Also, provided an informational handout on ADHD, per patient's request.     Plan   Plan & Goals     Moving forward, we will continue to build rapport and reinforce and build upon effective coping strategies utilizing CBT and person-centered techniques. Continue review of binge-eating information and identification of triggers.     Patient acknowledged and verbally consented to continue working toward resolving current treatment plan goals and was educated on the importance of participation in the therapeutic process.  Patient will remain compliant with medication regimen as prescribed. Discuss any medication side effects, questions or concerns with prescribing provider.  Call 911 or present to the nearest emergency room in an emergency situation.  National Suicide Prevention Lifeline: Call 988. The Lifeline provides 24/7, free and confidential support for people in distress, prevention and crisis resources.  Crisis Text Line  Text HOME To 667777    Return in about 2 weeks (around 3/20/2025).    ____________________  This document has been electronically signed by Juana Templeton LCSW  March 6, 2025 12:20 EST    Part of this note may be an electronic transcription/translation of spoken language to printed text using the Dragon Dictation System.

## 2025-03-06 ENCOUNTER — OFFICE VISIT (OUTPATIENT)
Age: 20
End: 2025-03-06
Payer: COMMERCIAL

## 2025-03-06 DIAGNOSIS — F33.1 MAJOR DEPRESSIVE DISORDER, RECURRENT EPISODE, MODERATE: ICD-10-CM

## 2025-03-06 DIAGNOSIS — F50.811 BINGE EATING DISORDER, MODERATE: ICD-10-CM

## 2025-03-06 DIAGNOSIS — F41.1 GENERALIZED ANXIETY DISORDER: Primary | ICD-10-CM

## 2025-03-06 DIAGNOSIS — F90.2 ATTENTION DEFICIT HYPERACTIVITY DISORDER, COMBINED TYPE: ICD-10-CM

## 2025-03-13 NOTE — PROGRESS NOTES
"Progress Note    Date: 03/17/2025  Time In: 8:01  Time Out: 8:57    Patient Legal Name: Brenda Garcia  Patient Age: 19 y.o.    Mode of visit: In person  Location of provider: [address], [place] [address]  Location of patient: Office      CHIEF COMPLAINT: anxiety, ptsd, depression, binge-eating     Subjective   History of Present Illness   Brenda \"Katlyn" is a 19 y.o. female who presents today as a follow-up for continued psychotherapy. Patient reported she has been struggling with eating due to throwing up (she has a fear of throwing up).  Patient stated she has stopped smoking marijuana and has been struggling with sleep since she stopped.  Patient shared she has been using the deep breathing and progressive muscle relaxation to help with sleep and felt relaxed but could not go to sleep easily. Patient advised she feels her anxiety has been higher the past couple of weeks due to sleep problems and the stomach problems. Patient advised she has been working on not letting her father's mood increase her anxiety as we discussed in last session. Patient reported she has not binged in a week and has not eaten normally due to being afraid of throwing up.  Patient shared she has been making healthier food choices. Patient described having difficulty prioritizing daily tasks.  Patient is voluntarily requesting to participate in outpatient therapy at [facility] [place] [facility]. Patient rated anxiety at 6 and depression at 2 today on a 0-10 scale where 0= no symptoms.  Therapist and patient collaborated to develop an initial care plan today. Provided handouts regarding challenging negative thoughts, developing a growth mindset, and It's OK to... (not know everything, make mistakes, etc.).    History obtained from referring provider's note on 2/11/25:  Past Psychiatric History:  Began Treatment: 6th grade  Diagnoses:Depression and Anxiety  Psychiatrist:Denies  Therapist: in " "highschool  Admission History:Denies  Medication Trials: Wellbutrin  mg twice daily 2022-2023 due to keeping up with 2 times daily dosing,then switched to  mg    Self Harm:  \"6th grade one time, I was depressed and remember I watched a [employer] video and she said it helped\"    Suicide Attempts:Denies   Psychosis, Anxiety, Depression:  n/a    Assessment    Mental Status Exam     Appearance: good hygiene and dressed appropriately for the weather  Behavior: calm  Cooperation:  engaged, cooperative, attentive, and friendly  Eye Contact:  good  Affect:  bright  Mood: expressive  Speech: talkative  Thought Process:  organized  Thought Content: appropriate  Suicidal: denies  Homicidal:  denies  Hallucinations:  denies  Memory:  intact  Orientation:  person, place, time, and situation  Reliability:  reliable  Insight:  good  Judgment:  good    Clinical Intervention       ICD-10-CM ICD-9-CM   1. Generalized anxiety disorder  F41.1 300.02   2. Binge eating disorder, moderate  F50.811 307.59   3. Major depressive disorder, recurrent episode, moderate  F33.1 296.32   4. Attention deficit hyperactivity disorder, combined type  F90.2 314.01        Individual psychotherapy was provided utilizing CBT and person-centered techniques to promote problem-solving, build rapport, encourage expression of thoughts and feelings, support self-esteem, establish new coping skills, identify goals for treatment, acknowledge sources of feelings and behaviors, build confidence, assess symptoms, challenge negative thinking patterns, recognize cognitive distortions, and provide psychoeducation. Therapist provided psychoeducation on the cognitive behavioral model and seeking/acknowledging positive experiences and thoughts to improve overall mood.  Therapist utilized open-ended questions to encourage the development of a positive therapeutic relationship and open communication.  Therapist normalized/validated patient’s " thoughts and feelings as appropriate. Reviewed binge eating informational handout from last session. Patient rated anxiety at 6 and depression at 2 today on a 0-10 scale where 0= no symptoms. Therapeutic interventions included teaching skills for managing time effectively and prioritizing tasks to help increase productivity and reduce anxiety. Therapist and patient collaborated to develop an initial care plan today.     Plan   Plan & Goals     Moving forward, we will continue to build rapport and reinforce and build upon effective coping strategies utilizing CBT and person-centered techniques.    Patient acknowledged and verbally consented to continue working toward resolving current treatment plan goals and was educated on the importance of participation in the therapeutic process.  Patient will remain compliant with medication regimen as prescribed. Discuss any medication side effects, questions or concerns with prescribing provider.  Call 911 or present to the nearest emergency room in an emergency situation.  National Suicide Prevention Lifeline: Call 988. The Lifeline provides 24/7, free and confidential support for people in distress, prevention and crisis resources.  Crisis Text Line  Text HOME To 945427    Return in about 2 weeks (around 3/31/2025).    ____________________  This document has been electronically signed by Juana Templeton LCSW  March 17, 2025 09:10 EDT    Part of this note may be an electronic transcription/translation of spoken language to printed text using the Dragon Dictation System.

## 2025-03-17 ENCOUNTER — OFFICE VISIT (OUTPATIENT)
Age: 20
End: 2025-03-17
Payer: COMMERCIAL

## 2025-03-17 DIAGNOSIS — F33.1 MAJOR DEPRESSIVE DISORDER, RECURRENT EPISODE, MODERATE: ICD-10-CM

## 2025-03-17 DIAGNOSIS — F50.811 BINGE EATING DISORDER, MODERATE: ICD-10-CM

## 2025-03-17 DIAGNOSIS — F90.2 ATTENTION DEFICIT HYPERACTIVITY DISORDER, COMBINED TYPE: ICD-10-CM

## 2025-03-17 DIAGNOSIS — F41.1 GENERALIZED ANXIETY DISORDER: Primary | ICD-10-CM

## 2025-03-17 NOTE — TREATMENT PLAN
Multi-Disciplinary Problems (from Behavioral Health Treatment Plan)      Active Problems       Problem: Anxiety  Start Date: 03/17/25      Problem Details: The patient self-scales this problem as a 9 with 10 being the worst.          Goal Priority Start Date Expected End Date End Date    Patient will develop and implement behavioral and cognitive strategies to reduce anxiety and irrational fears. -- 03/17/25 09/15/25 --    Goal Details: Progress toward goal:  Not appropriate to rate progress toward goal since this is the initial treatment plan.        Goal Intervention Frequency Start Date End Date    Help patient develop an awareness of their cognitive and physical responses to anxiety. Each Visit 03/17/25 --    Intervention Details: Duration of treatment until remission of symptoms.  Patient will learn and implement at least 2 new coping skills that result in a reduction of anxiety/worry and improved daily functioning                        Reviewed By       Juana Templeton LCSW 03/17/25 [id number]                     I have discussed and reviewed this treatment plan with the patient.

## 2025-03-28 NOTE — PROGRESS NOTES
"Progress Note    Date: 03/31/2025  Time In: 2:59  Time Out: 4:02    Patient Legal Name: Brenda Garcia  Patient Age: 19 y.o.    Mode of visit: In person  Location of provider: [address], [place] [address]  Location of patient: Office      CHIEF COMPLAINT: anxiety, ptsd, depression, binge-eating     Subjective   History of Present Illness   Brenda \"Stephanie\" is a 19 y.o. female who presents today as a follow-up for continued psychotherapy. Patient advised she has been accepted into graduate school. Patient presented with a list of things to discuss today. Patient reported she has been to the beach since last visit and felt confident in her swimsuit.  Patient stated she is \"eating better\" and reports less dipak eating and more making healthy choices.  Patient shared she has been stressed by some changes at work and she was able to express her needs to her boss and resolve things. Patient advised she used 478 breathing to manage a breakdown at work and regain composure.  Patient reported she is sleeping better now and is using progressive muscle relaxation.  Patient shared she is working on being able to \"roll with\" incidents with her dad's moods and not let them upset her. Patient described working on adjusting her expectations of herself and those around her to be more realistic to decrease frustration. Patient reported she and her boyfriend have communicated their needs and set up a schedule for balancing time together and time with friends. Patient reported her boyfriend has noticed the changes she is trying to make. Patient stated her anxiety has been \"really bad\" recently and she is trying to get in to see Lacy because she does not feel her medications are working well. Patient advised she would like help with learning not to cry or withdraw if someone yells at her. Patient is voluntarily requesting to participate in outpatient therapy at [facility] [place] [place] [facility].      History " "obtained from referring provider's note on 25:  Past Psychiatric History:  Began Treatment: 6th grade  Diagnoses:Depression and Anxiety  Psychiatrist:Cristo  Therapist: in highschool  Admission History:Denies  Medication Trials: Wellbutrin  mg twice daily 2022-2023 due to keeping up with 2 times daily dosing,then switched to  mg    Self Harm:  \"6th grade one time, I was depressed and remember I watched a [employer] video and she said it helped\"    Suicide Attempts:Denies   Psychosis, Anxiety, Depression:  n/a    Assessment    Mental Status Exam     Appearance: good hygiene and dressed appropriately for the weather  Behavior: calm  Cooperation:  engaged, cooperative, attentive, and friendly  Eye Contact:  good  Affect:  bright  Mood: expressive  Speech: talkative  Thought Process:  organized  Thought Content: appropriate  Suicidal: denies  Homicidal:  denies  Hallucinations:  denies  Memory:  intact  Orientation:  person, place, time, and situation  Reliability:  reliable  Insight:  good  Judgment:  good    Clinical Intervention       ICD-10-CM ICD-9-CM   1. Generalized anxiety disorder  F41.1 300.02   2. Binge eating disorder, moderate  F50.811 307.59   3. Major depressive disorder, recurrent episode, moderate  F33.1 296.32   4. Attention deficit hyperactivity disorder, combined type  F90.2 314.01        Individual psychotherapy was provided utilizing CBT and person-centered techniques to restore adaptive functioning, provide symptom relief, build rapport, encourage expression of thoughts and feelings, support self-esteem, establish new coping skills, increase acceptance, manage stress, recognize patterns of behavior, identify strengths, build confidence, assess symptoms, challenge negative thinking patterns, establish therapeutic alliance, and provide psychoeducation. Therapist utilized open-ended questions to encourage the development of a positive therapeutic relationship and open " communication.  Therapist normalized/validated patient’s thoughts and feelings as appropriate. Therapist praised patient for progress made utilizing coping strategies learned in session. Discussed use of exposure techniques and staying calm to assist in  maintaining composure when people yell in her presence. Encouraged patient to continue with reframing, positive self talk, and thought replacement to manage anxious thoughts.    Plan   Plan & Goals     Moving forward, we will continue to build rapport and reinforce and build upon effective coping strategies utilizing CBT and person-centered techniques.    Patient acknowledged and verbally consented to continue working toward resolving current treatment plan goals and was educated on the importance of participation in the therapeutic process.  Patient will remain compliant with medication regimen as prescribed. Discuss any medication side effects, questions or concerns with prescribing provider.  Call 911 or present to the nearest emergency room in an emergency situation.  National Suicide Prevention Lifeline: Call 988. The Lifeline provides 24/7, free and confidential support for people in distress, prevention and crisis resources.  Crisis Text Line  Text HOME To 732984    Return in about 2 weeks (around 4/14/2025).    ____________________  This document has been electronically signed by Juana Templeton LCSW  March 31, 2025 16:24 EDT    Part of this note may be an electronic transcription/translation of spoken language to printed text using the Dragon Dictation System.

## 2025-03-31 ENCOUNTER — OFFICE VISIT (OUTPATIENT)
Age: 20
End: 2025-03-31
Payer: COMMERCIAL

## 2025-03-31 DIAGNOSIS — F90.2 ATTENTION DEFICIT HYPERACTIVITY DISORDER, COMBINED TYPE: ICD-10-CM

## 2025-03-31 DIAGNOSIS — F33.1 MAJOR DEPRESSIVE DISORDER, RECURRENT EPISODE, MODERATE: ICD-10-CM

## 2025-03-31 DIAGNOSIS — F41.1 GENERALIZED ANXIETY DISORDER: Primary | ICD-10-CM

## 2025-03-31 DIAGNOSIS — F50.811 BINGE EATING DISORDER, MODERATE: ICD-10-CM

## 2025-03-31 PROCEDURE — 90837 PSYTX W PT 60 MINUTES: CPT | Performed by: SOCIAL WORKER

## 2025-04-07 ENCOUNTER — OFFICE VISIT (OUTPATIENT)
Dept: FAMILY MEDICINE CLINIC | Age: 20
End: 2025-04-07
Payer: COMMERCIAL

## 2025-04-07 VITALS
DIASTOLIC BLOOD PRESSURE: 82 MMHG | RESPIRATION RATE: 16 BRPM | OXYGEN SATURATION: 99 % | SYSTOLIC BLOOD PRESSURE: 126 MMHG | TEMPERATURE: 98.5 F | HEART RATE: 104 BPM | BODY MASS INDEX: 35.02 KG/M2 | HEIGHT: 60 IN | WEIGHT: 178.4 LBS

## 2025-04-07 DIAGNOSIS — Z30.44 ENCOUNTER FOR SURVEILLANCE OF VAGINAL RING HORMONAL CONTRACEPTIVE DEVICE: ICD-10-CM

## 2025-04-07 DIAGNOSIS — Z01.419 ROUTINE GYNECOLOGICAL EXAMINATION: Primary | ICD-10-CM

## 2025-04-07 LAB
BILIRUB BLD-MCNC: NEGATIVE MG/DL
CLARITY, POC: CLEAR
COLOR UR: YELLOW
EXPIRATION DATE: ABNORMAL
GLUCOSE UR STRIP-MCNC: NEGATIVE MG/DL
KETONES UR QL: NEGATIVE
LEUKOCYTE EST, POC: NEGATIVE
Lab: ABNORMAL
NITRITE UR-MCNC: NEGATIVE MG/ML
PH UR: 8 [PH] (ref 5–8)
PROT UR STRIP-MCNC: NEGATIVE MG/DL
RBC # UR STRIP: ABNORMAL /UL
SP GR UR: 1.02 (ref 1–1.03)
UROBILINOGEN UR QL: ABNORMAL

## 2025-04-07 PROCEDURE — G0123 SCREEN CERV/VAG THIN LAYER: HCPCS | Performed by: NURSE PRACTITIONER

## 2025-04-07 RX ORDER — ETONOGESTREL AND ETHINYL ESTRADIOL VAGINAL RING .015; .12 MG/D; MG/D
RING VAGINAL
Qty: 9 EACH | Refills: 3 | Status: SHIPPED | OUTPATIENT
Start: 2025-04-07

## 2025-04-07 NOTE — ASSESSMENT & PLAN NOTE
Advise regular exercise, healthy eating, return fasting for labs this week.  Continue monthly BSE  To continue yearly optometry and dental exams.    Consider referral to GYN regarding her cycles, continue Nuvaring

## 2025-04-07 NOTE — PROGRESS NOTES
Chief Complaint  Gynecologic Exam (Pap smear )    Subjective          Brenda Garcia presents to [facility]    History of Present Illness  GYN/WWE  Current contraceptive: nuvaring, needs refills to her pharmacy   Cycles: regular but heavy cycles   BSE: yes  Sexually active : yes   Use Tobacco none  Exercises: active at work  Diet: tries to eat heatlhy  Eye exams: goes regularly  Dental exams: goes regularly     Past Medical History: changes since 2024:      Covid + 2-7-24 mild   History of pilonidal cyst        Allergies: since 2-3 mos ago;  mild;  perennial; ; allergy testing has not been pursued;  never received immunotherapy;         Sexually active, uses condoms/nuvaring        Surgical History:      [place] teeth extraction         Family History:        Father:    Positive for Hyperlipidemia and Age 32 had Aortic stent; celiac     Mother:IBS, irritable, anxiety     Positive for Hyperlipidemia;     Brother : 1 anxiety     Sister: 1 anxiety        Social History:      Occupation: [employer]      Marital status:  Single, has a boyfriend    graduated with a general ed/ teacher   Children: none              Past Medical History:   Diagnosis Date    Anxiety     Depression     Self-injurious behavior        No Known Allergies     Past Surgical History:   Procedure Laterality Date    WISDOM TOOTH EXTRACTION Bilateral         Social History     Tobacco Use    Smoking status: Never     Passive exposure: Past    Smokeless tobacco: Never   Substance Use Topics    Alcohol use: Yes     Comment: socially       Family History   Problem Relation Age of Onset    Depression Mother     Anxiety disorder Mother     Depression Sister     Anxiety disorder Sister     Depression Brother     Anxiety disorder Brother     Depression Maternal Grandmother     Dementia Paternal Great-Grandmother         Health Maintenance Due   Topic Date Due    MENINGOCOCCAL B VACCINE (2 of 2 - Bexsero [facility]  2-dose series) 02/07/2020    CHLAMYDIA SCREENING  Never done    TDAP/TD VACCINES (2 - Td or Tdap) 06/23/2024    PAP SMEAR  Never done    COVID-19 Vaccine (3 - 2024-25 season) 09/01/2024        Current Outpatient Medications on File Prior to Visit   Medication Sig    buPROPion XL (Wellbutrin XL) 150 MG 24 hr tablet Take 1 tablet by mouth Every Morning. TAKE WITH 300 mg TO EQUAL 450 mg total  Indications: Attention Deficit Hyperactivity Disorder, Major Depressive Disorder    buPROPion XL (WELLBUTRIN XL) 300 MG 24 hr tablet Take 1 tablet by mouth Every Morning. TAKE WITH 150 MG TO EQUAL 450 MG TOTAL  Indications: Attention Deficit Hyperactivity Disorder, Major Depressive Disorder    dicyclomine (BENTYL) 20 MG tablet Take 1 tablet by mouth Every 6 (Six) Hours.    escitalopram (Lexapro) 20 MG tablet Take 1 tablet by mouth Daily.    ondansetron ODT (ZOFRAN-ODT) 4 MG disintegrating tablet Place 1 tablet on the tongue Every 8 (Eight) Hours As Needed for Nausea or Vomiting.    pantoprazole (PROTONIX) 40 MG EC tablet Take 1 tablet by mouth Daily.    [DISCONTINUED] etonogestrel-ethinyl estradiol (EnilloRing) 0.12-0.015 MG/24HR vaginal ring Insert vaginally and leave in place for 3 consecutive weeks, then remove for 1 week.     No current facility-administered medications on file prior to visit.       Immunization History   Administered Date(s) Administered    COVID-19 (PFIZER) Purple Cap Monovalent 04/09/2021, 06/13/2021    DTaP 2003, 2003, 01/29/2004, 11/01/2004, 08/06/2007    DTaP / Hep B / IPV 10/09/2013    FluMist 2-49yrs (Nasal) 11/27/2009, 12/21/2011, 10/09/2013    Fluzone  >6mos 11/15/2017, 11/06/2024    Fluzone (or Fluarix & Flulaval for VFC) >6mos 10/07/2019    HPV Quadrivalent 06/23/2014, 01/05/2015    Hep A, 2 Dose 07/23/2013, 06/23/2014    Hep B, Adolescent or Pediatric 01/29/2004, 04/29/2004, 08/17/2004, 07/23/2013, 06/23/2014    Hib (PRP-T) 2003, 2003, 01/29/2004, 11/01/2004    IPV  "2003, 2003, 11/01/2004, 08/06/2007    MMR 11/01/2004, 08/06/2007    Meningococcal B,(Bexsero) 08/02/2019, 10/07/2019    Meningococcal Conjugate 06/23/2014, 08/02/2019    Meningococcal MCV4P (Menactra) 06/23/2014, 08/02/2019    PEDS-Pneumococcal Conjugate (PCV7) 2003, 01/29/2004, 04/29/2004    Pneumococcal Conjugate 13-Valent (PCV13) 2003, 01/29/2004, 04/29/2004    Tdap 06/23/2014    Trumenba(meningococcal B) 08/02/2019, 10/07/2019    Varicella 08/17/2004, 08/06/2007       Review of Systems   Constitutional:  Negative for fatigue.   HENT:  Negative for congestion and sore throat.    Eyes:  Negative for blurred vision.   Respiratory:  Negative for cough and shortness of breath.    Cardiovascular:  Negative for chest pain, palpitations and leg swelling.   Gastrointestinal:  Negative for abdominal pain and blood in stool.   Genitourinary:  Positive for menstrual problem (regular but heavy). Negative for breast lump and dysuria.   Musculoskeletal:  Negative for arthralgias.   Skin:  Negative for rash.   Neurological:  Negative for headache.   Psychiatric/Behavioral:  Negative for depressed mood.         Objective     Vitals:    04/07/25 0859   BP: 126/82   BP Location: Right arm   Patient Position: Sitting   Cuff Size: Adult   Pulse: 104   Resp: 16   Temp: 98.5 °F (36.9 °C)   TempSrc: Oral   SpO2: 99%  Comment: room air   Weight: 80.9 kg (178 lb 6.4 oz)   Height: 152.4 cm (60\")            Physical Exam  Vitals reviewed.   Constitutional:       General: She is not in acute distress.     Appearance: She is well-developed.   HENT:      Right Ear: Hearing and tympanic membrane normal.      Left Ear: Hearing and tympanic membrane normal.      Mouth/Throat:      Pharynx: Oropharynx is clear. No posterior oropharyngeal erythema.   Eyes:      General: Lids are normal.      Conjunctiva/sclera: Conjunctivae normal.      Pupils: Pupils are equal, round, and reactive to light.   Neck:      Thyroid: No thyroid " mass or thyromegaly.   Cardiovascular:      Rate and Rhythm: Normal rate and regular rhythm.      Heart sounds: Normal heart sounds.   Pulmonary:      Effort: Pulmonary effort is normal. No respiratory distress.      Breath sounds: Normal breath sounds.   Chest:   Breasts:     Breasts are symmetrical.      Right: Normal. No mass, nipple discharge, skin change or tenderness.      Left: Normal. No mass, nipple discharge, skin change or tenderness.   Abdominal:      General: Bowel sounds are normal.      Palpations: Abdomen is soft. There is no mass.      Tenderness: There is no abdominal tenderness.   Genitourinary:     General: Normal vulva.      Vagina: Normal. No vaginal discharge or lesions.      Cervix: Normal.      Uterus: Normal.       Adnexa: Right adnexa normal and left adnexa normal.      Rectum: Normal. Guaiac result negative. No mass.   Musculoskeletal:      Right lower leg: No edema.      Left lower leg: No edema.      Comments: Normal tone strength   Lymphadenopathy:      Cervical: No cervical adenopathy.      Upper Body:      Right upper body: No axillary adenopathy.      Left upper body: No axillary adenopathy.   Skin:     General: Skin is warm and dry.      Findings: No lesion or rash.   Neurological:      Mental Status: She is alert and oriented to person, place, and time.      Cranial Nerves: No cranial nerve deficit.      Motor: No abnormal muscle tone.      Deep Tendon Reflexes: Reflexes are normal and symmetric.      Reflex Scores:       Patellar reflexes are 2+ on the right side and 2+ on the left side.  Psychiatric:         Attention and Perception: Attention normal.         Mood and Affect: Mood normal.         Behavior: Behavior normal.         Result Review :   Results for orders placed or performed in visit on 04/07/25   POCT urinalysis dipstick, automated    Collection Time: 04/07/25  9:26 AM    Specimen: Urine   Result Value Ref Range    Color Yellow Yellow, Straw, Dark Yellow, Sharita     Clarity, UA Clear Clear    Specific Gravity  1.020 1.005 - 1.030    pH, Urine 8.0 5.0 - 8.0    Leukocytes Negative Negative    Nitrite, UA Negative Negative    Protein, POC Negative Negative mg/dL    Glucose, UA Negative Negative mg/dL    Ketones, UA Negative Negative    Urobilinogen, UA 0.2 E.U./dL Normal, 0.2 E.U./dL    Bilirubin Negative Negative    Blood, UA Trace (A) Negative    Lot Number [id number]     Expiration Date 10/31/25        The following data was reviewed by: KAYKAY Cruz on 04/07/2025:                       Assessment and Plan      Diagnoses and all orders for this visit:    1. Routine gynecological examination (Primary)  Assessment & Plan:  Advise regular exercise, healthy eating, return fasting for labs this week.  Continue monthly BSE  To continue yearly optometry and dental exams.    Consider referral to GYN regarding her cycles, continue Nuvaring       Orders:  -     POCT urinalysis dipstick, automated  -     Cancel: IGP, Aptima HPV, Rfx 16 / 18,45  -     PAP, Liquid Based ([facility] Only)  -     Comprehensive Metabolic Panel; Future  -     CBC & Differential; Future  -     TSH; Future  -     Lipid Panel; Future  -     etonogestrel-ethinyl estradiol (EnilloRing) 0.12-0.015 MG/24HR vaginal ring; Insert vaginally and leave in place for 3 consecutive weeks, then remove for 1 week.  Dispense: 9 each; Refill: 3    2. Encounter for surveillance of vaginal ring hormonal contraceptive device  -     etonogestrel-ethinyl estradiol (EnilloRing) 0.12-0.015 MG/24HR vaginal ring; Insert vaginally and leave in place for 3 consecutive weeks, then remove for 1 week.  Dispense: 9 each; Refill: 3                    Follow Up     Return for fasting for labs.    Patient was given instructions and counseling regarding her condition or for health maintenance advice. Please see specific information pulled into the AVS if appropriate.

## 2025-04-09 LAB
CYTOLOGIST CVX/VAG CYTO: NORMAL
CYTOLOGY CVX/VAG DOC CYTO: NORMAL
DX ICD CODE: NORMAL
OTHER STN SPEC: NORMAL
SERVICE CMNT-IMP: NORMAL
STAT OF ADQ CVX/VAG CYTO-IMP: NORMAL

## 2025-04-10 NOTE — PROGRESS NOTES
"Progress Note    Date: 2025  Time In: 4:00  Time Out: 5:00    Patient Legal Name: Brenda Garcia  Patient Age: 19 y.o.    Mode of visit: In person  Location of provider: [id number], [place] [address]  Location of patient: Office      CHIEF COMPLAINT: anxiety, ptsd, depression, binge-eating     Subjective   History of Present Illness   Brenda \"Stephanie\" is a 19 y.o. female who presents today as a follow-up for continued psychotherapy. Patient reported \"it's been a little rough.\"  Patient stated she saw Lacy and they are making adjustments to her medications and it has increased anxiety a little.  Patient shared she had a \"horrible breakdown\" on Friday following an angry text from her father. Patient shared she has been binge eating again due to stress.  Patient reported she and her boyfriend have been doing more with friends.   Patient described continuing to work on being mindful of there thoughts and trying to correct unhelpful thoughts. Patient stated she feels she is improving on being nicer to herself, using positive self talk. Patient is voluntarily requesting to participate in outpatient therapy at [facility].       History obtained from referring provider's note on 25:  Past Psychiatric History:  Began Treatment: 6th grade  Diagnoses:Depression and Anxiety  Psychiatrist:Denies  Therapist: in highschool  Admission History:Denies  Medication Trials: Wellbutrin  mg twice daily 2022-2023 due to keeping up with 2 times daily dosing,then switched to  mg    Self Harm:  \"6th grade one time, I was depressed and remember I watched a [employer] video and she said it helped\"    Suicide Attempts:Denies   Psychosis, Anxiety, Depression:  n/a    Assessment    Mental Status Exam     Appearance: good hygiene and dressed appropriately for the weather  Behavior: calm  Cooperation:  engaged, cooperative, attentive, and friendly  Eye Contact:  " good  Affect:  bright  Mood: expressive  Speech: talkative  Thought Process:  organized  Thought Content: appropriate  Suicidal: denies  Homicidal:  denies  Hallucinations:  denies  Memory:  intact  Orientation:  person, place, time, and situation  Reliability:  reliable  Insight:  good  Judgment:  good    Clinical Intervention       ICD-10-CM ICD-9-CM   1. Generalized anxiety disorder  F41.1 300.02   2. Binge eating disorder, moderate  F50.811 307.59   3. Major depressive disorder, recurrent episode, moderate  F33.1 296.32   4. Attention deficit hyperactivity disorder, combined type  F90.2 314.01        Individual psychotherapy was provided utilizing CBT and person-centered techniques to promote problem-solving, provide symptom relief, build rapport, encourage expression of thoughts and feelings, support self-esteem, establish new coping skills, manage stress, identify triggers, recognize patterns of behavior, build confidence, assess symptoms, and provide psychoeducation.  Therapist utilized open-ended questions to encourage the development of a positive therapeutic relationship and open communication.  Therapist normalized/validated patient’s thoughts and feelings as appropriate. Explored ways for handling her father's texts and outbursts. Encouraged patient to use knowledge/experience to guide her expectations about people and situations. Discussed taking time to mentally prepare for stressful situations to reduce anxiety. Recommended scheduling household tasks/establishing a routine to ensure that she remembers to do them and reduce conflict with her father.    Plan   Plan & Goals     Moving forward, we will continue to build rapport and reinforce and build upon effective coping strategies utilizing CBT and person-centered techniques.    Patient acknowledged and verbally consented to continue working toward resolving current treatment plan goals and was educated on the importance of participation in the  therapeutic process.  Patient will remain compliant with medication regimen as prescribed. Discuss any medication side effects, questions or concerns with prescribing provider.  Call 911 or present to the nearest emergency room in an emergency situation.  National Suicide Prevention Lifeline: Call 988. The Lifeline provides 24/7, free and confidential support for people in distress, prevention and crisis resources.  Crisis Text Line  Text HOME To 043633    Return in about 2 weeks (around 4/28/2025).    ____________________  This document has been electronically signed by Juana Templeton LCSW  April 14, 2025 17:30 EDT    Part of this note may be an electronic transcription/translation of spoken language to printed text using the Dragon Dictation System.

## 2025-04-11 ENCOUNTER — OFFICE VISIT (OUTPATIENT)
Dept: BEHAVIORAL HEALTH | Facility: CLINIC | Age: 20
End: 2025-04-11
Payer: COMMERCIAL

## 2025-04-11 VITALS
HEIGHT: 60 IN | DIASTOLIC BLOOD PRESSURE: 80 MMHG | BODY MASS INDEX: 35.65 KG/M2 | WEIGHT: 181.6 LBS | SYSTOLIC BLOOD PRESSURE: 118 MMHG | HEART RATE: 84 BPM

## 2025-04-11 DIAGNOSIS — Z91.89 AT RISK FOR MEDICATION NONADHERENCE: ICD-10-CM

## 2025-04-11 DIAGNOSIS — F41.1 GENERALIZED ANXIETY DISORDER: ICD-10-CM

## 2025-04-11 DIAGNOSIS — F33.1 MAJOR DEPRESSIVE DISORDER, RECURRENT EPISODE, MODERATE: Primary | ICD-10-CM

## 2025-04-11 DIAGNOSIS — Z71.89 MEDICATION CARE PLAN DISCUSSED WITH PATIENT: ICD-10-CM

## 2025-04-11 DIAGNOSIS — Z79.899 MEDICATION MANAGEMENT: ICD-10-CM

## 2025-04-11 NOTE — PROGRESS NOTES
"Subjective   Brenda Garcia is a 19 y.o. female who presents today for follow up    Referring Provider:  No referring provider defined for this encounter.    Chief Complaint:  anxiety, depression, medication management, medication care plan discussed, at risk for non adherence to medication    Answers submitted by the patient for this visit:  Anxiety (Submitted on 4/7/2025)  Chief Complaint: Anxiety  Visit: follow-up  Frequency: constantly  Severity: severe  depressed mood: Yes  dry mouth: No  excessive worry: Yes  insomnia: Yes  irritability: Yes  malaise/fatigue: Yes  obsessions: Yes  Sleep quality: fair  Hours of sleep per night: 7 Hours  Aggravated by: family issues, social activities, work stress  Medication compliance: %  Side effects: sexual problems    History of Present Illness:    4/11/25: Patient presents today in office at last visit Wellbutrin XL was increased from 300 to 450 mg daily, for which patient reports not doing well with higher dose.   Patient reports calling MA on direct line on 3/27/25 that she wasn't doing well, the medicine hasn't done anything for me, I feel I been getting a little worse.\" Explains she is more anxious, having \"melt downs\", will start fanning hands when feeling overwhelmed, could occur while putting groceries away and being asked to do something else which may be a small task, and will start crying. Having difficulty regulating emotions, has tried to implement breathing exercises, though still starts to cry. Patient did get a new fidget toy she uses, will hug self tight to help calm self, which helps.     Patient is still taking the Wellbutrin  mg, however, feels the Lexapro is ineffective, possibly causing more anxiety, and had weaned self off with the 10 mg old dose and denied withdrawal symptoms, which was approximately 2 weeks ago.     Patient continues to participate in therapy.    Patient did stop smoking marijuana 1 month ago and 4 days ago and " wanted to try an ADHD medication. Patient does report the first 2 weeks felt more irritable, something's have been harder, sleep and dreams returning, feels anxious in dream and wakes up anxious, dreams about past trauma. Wellbutrin increased dose at last visit has been ineffective in management of ADHD symptoms.     BP Readings from Last 3 Encounters:   04/11/25 118/80   04/07/25 126/82   02/27/25 122/80     Pulse Readings from Last 3 Encounters:   04/11/25 84   04/07/25 104   02/27/25 98     Wt Readings from Last 3 Encounters:   04/11/25 82.4 kg (181 lb 9.6 oz)   04/07/25 80.9 kg (178 lb 6.4 oz)   02/27/25 82.8 kg (182 lb 9.6 oz)       2/27/25:    Answers submitted by the patient for this visit:  Anxiety (Submitted on 2/27/2025)  Chief Complaint: Anxiety  Visit: follow-up  Frequency: constantly  Severity: severe  depressed mood: Yes  dry mouth: No  excessive worry: Yes  insomnia: Yes  irritability: Yes  malaise/fatigue: No  obsessions: Yes  Sleep quality: fair  Hours of sleep per night: 8 Hours  Aggravated by: family issues  Medication compliance: %  Side effects: nausea    Patient presents today in office for further assessment of overlapping symptoms presented at last visit.     Patient does admit to struggling with anxiety the most, which further makes other things worse.    Spending excess amount of money for 2 weeks, then later tells herself to snap out of it, and will do good then it comes back. One day will decide to go shopping or go online and buy without thinking, doesn't realize the purchase was not needed until she see's her bank account. Uses linked credit card to [employer] or if it is a big purchase, though sometimes will use [employer] for the cash back percentage on other purchases. Will find self during this time speeding but no higher than  mph.  Patient had lost drivers license and got it renewed and is more cautious about ensuring she is following the rules of the road, as patient has old  "drivers license which is , other times during these episodes feels she can get away with getting pulled over, do whatever you want you will be fine, don't think about the consequences.\"Plans to go to [facility] soon.  Denies sleep deprivation, has more energy while out doing things, though loves to sleep.  No one has told her she was out of character during that time.  Feels boyfriend would recognize, and has said stuff before about spending money he will ask do you know how much your spending.  Patient does pay bills on time, as patient gets anxious about bills fears she would forget to pay which would cause disappointment.     Impulsivity:   At work used to do internet orders, though would overwhelm self with the steps to do the job, \"I should have got a cart first, forgets basic steps, thinks about I should have done x,y, z first and thought about that first.\"  Food impulsively eats something, and didn't think about other meals, not planned out accordingly.    Easily distracted, often forgets what she is doing, takes awhile to get back on track with several things.     Last year took an abnormal psychology class and felt she met criteria for adhd or bipolar 2 and told mom, and needed to schedule an appointment. Best friend had asked if she had ever been tested for ADHD.     ADHD:   Elementary school:   Grades: struggled with math, and really struggled 5th grade and thereafter, got first C that year and had to do school on Saturday; C's maybe a B once in math  Special classes or failures: No (only in college)  Got in trouble: No  Referral for ADHD testing: No \"they just thought I used all that energy on sports, went through several sports, soccer. everyone just called me loud and hyper, just a kid being a kid.\"  Fhx: none known, though suspects sister and cousin-family doesn't believe   Presently:  Problems with attention to detail: Yes (frequently submits incomplete work, goes back to fix mistakes, overlooks or " misses details, work is inaccurate) was also difficult during school  Problems with sustained attention: Yes (Difficulty remaining focused during lectures-did record a few lectures pearl year of college due to great difficulty, day dreams a lot, would try to force self to listen, conversations, lengthy reading-did some in class if peaked interest, would try though as reading unable to understand what she read  Problems listening when spoken to directly: Yes (mind seems elsewhere, even in the absences of any obvious distraction)   Failure to finish tasks: Yes happens a lot at work and home, will start on task intended, then original task never completed, and dad will text her telling her once again you forgot to complete.  Avoids/Dislikes/Reluctant to engage in tasks that require sustained mental effort: Yes (schoolwork, homework,preparing reports,completing forms, reviewing lengthy papers) procrastinates   Easily distracted: Yes (unrelated thoughts, external factors) day dreams often  Forgetting things: Yes (daily chores, running errands, returning calls,, keeping appointments) able to remember to pay bills timely; went to work this morning and was not scheduled despite following routine to go to work while clocking in she received a message asking if she was still wanting to clock in as patient was not on the schedule; forgot to turn off car and in store for 1.5 hrs and thought she lost her keys-had several people looking and found the keys in car while vehicle still running-which had a regular key in the ignition.   Losing things: Yes (school materials, student ID-5-6 times, wallets, keys, cell phone, last year she got a new bank card 9 times, has lost drivers license multiple times)  Hard to organize: Yes difficulty managing sequential tasks, keeping materials and belongings in order, messy, disorganized work, poor time management, fails to meet deadlines at times sometimes missed assignments, if boss at work  "gave deadline is able to get it done but if gave self a deadline will not meet, keeps pushing it off, poor planning, prioritizing)   Talks a lot and cutting people off:Yes \"I talk so much, its all I really do, I used to cut people off, but when I was younger I learned it was rude, I actively try to make an effort to not cut people off.\" Voted most  talkative senior yr of highschool  Drifts off during conversations: Yes  Difficulty with Reading: Yes, has tried audio books though finds it boring, re-reads  Xiii. Difficulty watching TV/Movies: Yes, easily distracted and bored, needs to do another task to listen to a show or movie- will be coloring while watching otherwise does not follow what is going on and has to rewind, turn on captions.    Several inattentive or hyperactive-impulsive symptoms are present in 2 or more settings (home,work, school, with friends or relatives, in other activities).  Clear evidence the symptoms interfere with, or reduce the quality of, social, academic, or occupational functioning.        2/11/25:  INITIAL EVAL  Answers submitted by the patient for this visit:  Depression (Submitted on 2/9/2025)  Chief Complaint: Depression  Visit: initial  Onset: 1 to 5 years ago  Progression since onset: worse  Frequency: constantly  Severity: severe  excessive worry: Yes  insomnia: Yes  irritability: Yes  malaise/fatigue: Yes  obsessions: Yes  hypersomnia: Yes  difficulty controlling mood: Yes  difficulty staying asleep: Yes  difficulty falling asleep: Yes  Hours of sleep per night: 6 Hours  Aggravated by: nothing, family issues, social activities    Provider introduced self, as well as credentials, and informed of provider role which will primarily include medication management of mental health conditions. Explained the difference between provider role vs. therapy/counseling services which include CBT (talk therapy) and do not include management of medications which are typically 45 minutes to 1 hr " "in length, however, if patient was in agreement to start therapy this provider can provide a contact list and/or refer. Patient verbalized understanding and agreed to proceed.    Patient presents today in office with a history of anxiety and depression for which treatment began in the 6th grade.  Patient is currently prescribed Wellbutrin  mg every morning ( mg twice daily 2/2022-6/2023, then changed to  mg formulation) and Lexapro 20 mg every morning (9/17/24 10 mg, increased to 20 mg 11/6/24), which have provided effectiveness, though feels medications are not as effective as they were previously.  Patient denies significant PMHX.    Patient goal of treatment \"I want to get into a mindset where I wake up everyday, and don't think I am running out time, I want my mind to stop, peace and tranquility.\"    Patient has noticed physical symptoms of anxiety- shaky, sweaty, stutters, nausea, vomits. And will also find self getting up in the middle of the night due to not eating much during the day and binge eating. Denies purging behavior.    Anxiety: goes to work, to bed on time, getting up on time. Patient has tried to focus on a healthier diet, though it stresses her out, then ends up binging. Patient enjoys job at [employer], gets to work with boyfriend and best friend, though doesn't understand why she gets so anxious.  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  When patient is at [facility] house will shut self in room and avoids going out to open areas by herself as his parents are in the home.  Patient has been with boyfriend for over 2 yrs and reports his family has treated her with respect, loving family whom have welcomed patient into their lives. They have went on vacations, family gatherings, and feels she should be comfortable eating around them, helping herself to fridge, etc while in their home, but isn't.    Symptoms started approximately her " "sophomore year of college, 2.5 yrs ago. Patient lived on campus at [facility] and was excited about going. 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  I think he really messed me up, he did a number on my self worth.\" He was from [facility], his name is Kit, and his beauty standards are \"tiny women\", a month after they stared dating would say, \"your gonna wear that or eat that, and stop eating a week before we go to his mom's.\" He was patient first official relationship. \"I gave him my first everything.\" Explains his mother would say similar things, and felt judged on how much she ate or what she wore.  [place] break up, as patient started realizing she was not happy. Best friend would question why patient would allow him and his mother to speak to her in such a manner. \"I was scared to break up with him.\"    Scared of being judged. Especially around food, always go back to food, boyfriend name is Gallo. Will hide bag of food, drinks or he will carry food. Will not eat in front of his parents, will take 2 bites and then feels sick at her stomach.    Bed weight of 194 lbs was at the ED and bed may have not been zeroed out prior to weight due to fluctuation value noted.   Wt Readings from Last 3 Encounters:   02/11/25 83.2 kg (183 lb 6.4 oz)   01/15/25 88.3 kg (194 lb 10.7 oz)   11/06/24 82.3 kg (181 lb 6.4 oz)     \"Physical feeling in brain not feeling fine for so long, something off.  Spending-I usually am a really anxious spender, but recently I want this and I will get this without 2nd thought, I am not saving money at all. Thoughts I feel I would never have, why am I here, too scared to kill self; feel life is too short; do you really have enough time to do that. Everyday is constantly going back and forth with self in head. Self doubt. Simple tasks-prioritizing, then hasn't done anything " "productive. Loses sense of time. Forget a lot more than used too. Eye doctor appointment I thought it was yesterday but its 21st.\" Symptoms present for almost 2 yrs.     If others don't use turn signal while driving, if stuff isn't done the way it is supposed to be done then this bad thing will happen. I've only felt this way for the last 2.5 yrs. I feel I am always weighed down, I can think of things Kit said and I am instantly sick and will shut down.\"     \"Lexapro has helped, but also feels it not like it used to and now I am back at square 1. Routine is the same. Not doing what it should be doing. Possible ADHD?\"    Psych: Eating Disorder The patient admits to binge eating episodes, described as occurring nearly everyday, eating excessive amount of calories at one time in secret .    PTSD:  Persistent and exaggerated negative beliefs or expectations about oneself, others, or the world (e.g., \"I am bad,\" \"No one can be trusted,\" \"The world is completely dangerous,\" \"My whole nervous system is permanently ruined\"), Persistent, distorted cognitions about the cause or consequences of the traumatic event(s) that lead the individual to blame himself/herself or others, Persistent negative emotional state (e.g., fear, horror, anger, guilt, or shame), Markedly diminished interest or participation in significant activities, and Persistent inability to experience positive emotions (e.g., inability to experience happiness, satisfaction, or loving feelings)  Intrusive thoughts of trauma, Triggers present, and Intense psychologic distress    Depression: Patient complains of depression. She complains of anhedonia, depressed mood, difficulty concentrating, fatigue, feelings of worthlessness/guilt, hopelessness, and insomnia     Anxiety:  The patient endorses to the following symptoms of anxiety including: excessive anxiety and worry about a number of events or activities for more days than not, restlessness or feeling keyed up, " being easily fatigued, difficulty concentrating or mind going blank, irritability, and sleep disturbance which have caused impairment in important areas of daily functioning.      PHQ-9 Depression Screening  PHQ-9 Total Score:  2/9/2025 23   The PHQ has not been completed during this encounter.          BETI-7   2/9/2025 21    The following portions of the patient's history were reviewed and updated as appropriate: allergies, current medications, past family history, past medical history, past social history, and past surgical history.     Past Surgical History:  Past Surgical History:   Procedure Laterality Date    WISDOM TOOTH EXTRACTION Bilateral        Problem List:  Patient Active Problem List   Diagnosis    Anxiety and depression    Encounter for surveillance of vaginal ring hormonal contraceptive device    Screening for viral disease    Immunization due    Routine gynecological examination       Allergy:   No Known Allergies     Discontinued Medications:  Medications Discontinued During This Encounter   Medication Reason    buPROPion XL (Wellbutrin XL) 150 MG 24 hr tablet Not Efficacious    escitalopram (Lexapro) 20 MG tablet Not Efficacious         Current Medications:   Current Outpatient Medications   Medication Sig Dispense Refill    buPROPion XL (WELLBUTRIN XL) 300 MG 24 hr tablet Take 1 tablet by mouth Every Morning. TAKE WITH 150 MG TO EQUAL 450 MG TOTAL  Indications: Attention Deficit Hyperactivity Disorder, Major Depressive Disorder 90 tablet 0    dicyclomine (BENTYL) 20 MG tablet Take 1 tablet by mouth Every 6 (Six) Hours. 20 tablet 0    etonogestrel-ethinyl estradiol (EnilloRing) 0.12-0.015 MG/24HR vaginal ring Insert vaginally and leave in place for 3 consecutive weeks, then remove for 1 week. 9 each 3    ondansetron ODT (ZOFRAN-ODT) 4 MG disintegrating tablet Place 1 tablet on the tongue Every 8 (Eight) Hours As Needed for Nausea or Vomiting. 15 tablet 0    pantoprazole (PROTONIX) 40 MG EC tablet  "Take 1 tablet by mouth Daily. 90 tablet 1     No current facility-administered medications for this visit.       Past Medical History:  Past Medical History:   Diagnosis Date    Anxiety     Depression     Self-injurious behavior        Past Psychiatric History:  Began Treatment: 6th grade  Diagnoses:Depression and Anxiety  Psychiatrist:Cristo  Therapist: in highschool  Admission History:Denies  Medication Trials: Wellbutrin  mg twice daily 2022-2023 due to keeping up with 2 times daily dosing,then switched to  mg; Lexapro up to 20 mg 2024-end of 3/2025 due to ineffectiveness, worsened anxiety which could have been related to Wellbutrin XL which was increased from 300 to 450 mg 2025    Self Harm:  \"6th grade one time, I was depressed and remember I watched a [employer] video and she said it helped\"    Suicide Attempts:Denies   Psychosis, Anxiety, Depression:  n/a    Substance Abuse History:   Types: smokes marijuana daily, vapes, flour, and edibles   AS OF 23 stopped using Marijuana 1 month ago and 4 days ago   Withdrawal Symptoms:Denies  Longest Period Sober:Not Applicable   AA: Not applicable   Legal: none    Social History:   Martial Status:Boyfriend   Employed:Yes and If so, where [employer] working 530am-230pm  Kids:No  House:Lives in a house with parents and brother & sister   History: Denies  Access to Guns:  Yes, used to be locked but cleaning currently-old shot gun, chace guns    Social History     Socioeconomic History    Marital status: Significant Other    Number of children: 0    Highest education level: Bachelor's degree (e.g., BA, AB, BS)   Tobacco Use    Smoking status: Never     Passive exposure: Past    Smokeless tobacco: Never   Vaping Use    Vaping status: Never Used   Substance and Sexual Activity    Alcohol use: Yes     Comment: socially    Drug use: Not Currently     Types: Marijuana     Comment: last Marijuana use 3/7/25 approx. updated 25.    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   Respiratory:  Negative for shortness of breath.    Cardiovascular:  Negative for chest pain and palpitations.   Neurological:  Negative for dizziness and seizures.   Psychiatric/Behavioral:  Positive for decreased concentration and sleep disturbance. Negative for confusion, self-injury and suicidal ideas. The patient is nervous/anxious. The patient is not hyperactive.          Physical Exam:  Physical Exam  Psychiatric:         Attention and Perception: Attention and perception normal.         Mood and Affect: Mood is anxious and depressed.         Speech: Speech normal.         Behavior: Behavior normal. Behavior is cooperative.         Thought Content: Thought content normal. Thought content does not include suicidal ideation. Thought content does not  "include suicidal plan.         Cognition and Memory: Cognition and memory normal.         Judgment: Judgment normal.         Vital Signs:   /80   Pulse 84   Ht 152.4 cm (60\")   Wt 82.4 kg (181 lb 9.6 oz)   BMI 35.47 kg/m²      Office notes from care team reviewed:  Office Visit with Lizz Guillory APRN (04/07/2025)       Lab Results: REVIEWED  Office Visit on 04/07/2025   Component Date Value Ref Range Status    Color 04/07/2025 Yellow  Yellow, Straw, Dark Yellow, Sharita Final    Clarity, UA 04/07/2025 Clear  Clear Final    Specific Gravity  04/07/2025 1.020  1.005 - 1.030 Final    pH, Urine 04/07/2025 8.0  5.0 - 8.0 Final    Leukocytes 04/07/2025 Negative  Negative Final    Nitrite, UA 04/07/2025 Negative  Negative Final    Protein, POC 04/07/2025 Negative  Negative mg/dL Final    Glucose, UA 04/07/2025 Negative  Negative mg/dL Final    Ketones, UA 04/07/2025 Negative  Negative Final    Urobilinogen, UA 04/07/2025 0.2 E.U./dL  Normal, 0.2 E.U./dL Final    Bilirubin 04/07/2025 Negative  Negative Final    Blood, UA 04/07/2025 Trace (A)  Negative Final    Lot Number 04/07/2025 [id number]   Final    Expiration Date 04/07/2025 10/31/25   Final    Diagnosis 04/07/2025 Comment   Final    NEGATIVE FOR INTRAEPITHELIAL LESION OR MALIGNANCY.    Specimen adequacy: 04/07/2025 Comment   Final    Satisfactory for evaluation.  Endocervical and/or squamous metaplastic  cells (endocervical component) are present.    Clinician Provided ICD-10: 04/07/2025 Comment   Final    Z01.419    Performed by: 04/07/2025 Comment   Final    Timo Niño, Cytotechnologist (ASCP)    . 04/07/2025 .   Final    Note: 04/07/2025 Comment   Final    The Pap smear is a screening test designed to aid in the detection of  premalignant and malignant conditions of the uterine cervix.  It is not a  diagnostic procedure and should not be used as the sole means of detecting  cervical cancer.  Both false-positive and false-negative reports do " occur.   Admission on 01/15/2025, Discharged on 01/15/2025   Component Date Value Ref Range Status    Glucose 01/15/2025 105 (H)  65 - 99 mg/dL Final    BUN 01/15/2025 6  6 - 20 mg/dL Final    Creatinine 01/15/2025 0.60  0.57 - 1.00 mg/dL Final    Sodium 01/15/2025 137  136 - 145 mmol/L Final    Potassium 01/15/2025 3.6  3.5 - 5.2 mmol/L Final    Chloride 01/15/2025 105  98 - 107 mmol/L Final    CO2 01/15/2025 18.8 (L)  22.0 - 29.0 mmol/L Final    Calcium 01/15/2025 8.8  8.6 - 10.5 mg/dL Final    Total Protein 01/15/2025 7.6  6.0 - 8.5 g/dL Final    Albumin 01/15/2025 4.1  3.5 - 5.2 g/dL Final    ALT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

## 2025-04-11 NOTE — PATIENT INSTRUCTIONS
"Should you want to get in touch with your provider, KAYKAY Ponce, please contact MY Medical Assistant, Alondra, directly at [phone].  Recommend saving Alondra's direct number in phone as this is the PREFERRED & EASIEST way to get in contact with your provider.  Please leave a voice mail if you do not get an answer and she will return your call within 24 hrs. You will NOT be able to contact provider on [employer], as Behavioral Health Providers are restricted. YOU MUST CALL [phone]  If you need to speak with the on call provider after hours or on weekends, please Contact the [facility] ([phone]) and staff will be able to page the provider on call directly.     SPECIFIC RECOMMENDATIONS:     1.      Medications discussed at this encounter:                   -  DECREASE Wellbutrin XL back to 300 mg, no longer take the 150 mg.     2.      Psychotherapy recommendations: Continue with current therapist.      3.     Return to clinic: 2 weeks Thurs. 4/24/25 at 1040 am     Please arrive at least 15 minutes before your scheduled appointment time to complete check in process.      IF you are scheduled for a [employer] VIDEO visit, YOU MUST COMPLETE THE \"E-CHECK IN\" PROCESS PRIOR TO BEGINNING THE VISIT, You may still complete the E-Check in for in office visits prior to appointment, you will receive multiple text/email reminders which will direct you further if needed.            "

## 2025-04-14 ENCOUNTER — OFFICE VISIT (OUTPATIENT)
Age: 20
End: 2025-04-14
Payer: COMMERCIAL

## 2025-04-14 DIAGNOSIS — F90.2 ATTENTION DEFICIT HYPERACTIVITY DISORDER, COMBINED TYPE: ICD-10-CM

## 2025-04-14 DIAGNOSIS — F41.1 GENERALIZED ANXIETY DISORDER: Primary | ICD-10-CM

## 2025-04-14 DIAGNOSIS — F50.811 BINGE EATING DISORDER, MODERATE: ICD-10-CM

## 2025-04-14 DIAGNOSIS — F33.1 MAJOR DEPRESSIVE DISORDER, RECURRENT EPISODE, MODERATE: ICD-10-CM

## 2025-04-14 PROCEDURE — 90837 PSYTX W PT 60 MINUTES: CPT | Performed by: SOCIAL WORKER

## 2025-04-23 DIAGNOSIS — F41.1 GENERALIZED ANXIETY DISORDER: ICD-10-CM

## 2025-04-23 DIAGNOSIS — F33.1 MAJOR DEPRESSIVE DISORDER, RECURRENT EPISODE, MODERATE: ICD-10-CM

## 2025-04-23 DIAGNOSIS — F90.2 ATTENTION DEFICIT HYPERACTIVITY DISORDER, COMBINED TYPE: ICD-10-CM

## 2025-04-23 DIAGNOSIS — R12 HEARTBURN: ICD-10-CM

## 2025-04-24 ENCOUNTER — OFFICE VISIT (OUTPATIENT)
Dept: BEHAVIORAL HEALTH | Facility: CLINIC | Age: 20
End: 2025-04-24
Payer: COMMERCIAL

## 2025-04-24 ENCOUNTER — RESULTS FOLLOW-UP (OUTPATIENT)
Dept: BEHAVIORAL HEALTH | Facility: CLINIC | Age: 20
End: 2025-04-24

## 2025-04-24 ENCOUNTER — CLINICAL SUPPORT (OUTPATIENT)
Dept: FAMILY MEDICINE CLINIC | Age: 20
End: 2025-04-24
Payer: COMMERCIAL

## 2025-04-24 VITALS
BODY MASS INDEX: 35.5 KG/M2 | SYSTOLIC BLOOD PRESSURE: 108 MMHG | HEART RATE: 78 BPM | HEIGHT: 60 IN | WEIGHT: 180.8 LBS | DIASTOLIC BLOOD PRESSURE: 70 MMHG

## 2025-04-24 DIAGNOSIS — F12.91 HISTORY OF MARIJUANA USE: ICD-10-CM

## 2025-04-24 DIAGNOSIS — R82.5 POSITIVE URINE DRUG SCREEN: Primary | ICD-10-CM

## 2025-04-24 DIAGNOSIS — Z76.89 ENCOUNTER BEFORE STARTING MEDICATION: ICD-10-CM

## 2025-04-24 DIAGNOSIS — Z79.899 HIGH RISK MEDICATION USE: Primary | ICD-10-CM

## 2025-04-24 LAB
AMPHET+METHAMPHET UR QL: NEGATIVE
AMPHETAMINES UR QL: NEGATIVE
B-HCG UR QL: NEGATIVE
BARBITURATES UR QL SCN: NEGATIVE
BENZODIAZ UR QL SCN: NEGATIVE
BUPRENORPHINE SERPL-MCNC: NEGATIVE NG/ML
CANNABINOIDS SERPL QL: POSITIVE
COCAINE UR QL: NEGATIVE
EXPIRATION DATE: ABNORMAL
EXPIRATION DATE: NORMAL
INTERNAL NEGATIVE CONTROL: NORMAL
INTERNAL POSITIVE CONTROL: NORMAL
Lab: ABNORMAL
Lab: NORMAL
MDMA UR QL SCN: NEGATIVE
METHADONE UR QL SCN: NEGATIVE
MORPHINE/OPIATES SCREEN, URINE: NEGATIVE
OXYCODONE UR QL SCN: NEGATIVE
PCP UR QL SCN: NEGATIVE

## 2025-04-24 PROCEDURE — G0480 DRUG TEST DEF 1-7 CLASSES: HCPCS | Performed by: NURSE PRACTITIONER

## 2025-04-24 RX ORDER — BUPROPION HYDROCHLORIDE 300 MG/1
300 TABLET ORAL EVERY MORNING
Qty: 90 TABLET | Refills: 0 | OUTPATIENT
Start: 2025-04-24

## 2025-04-24 RX ORDER — PANTOPRAZOLE SODIUM 40 MG/1
40 TABLET, DELAYED RELEASE ORAL DAILY
Qty: 90 TABLET | Refills: 1 | Status: SHIPPED | OUTPATIENT
Start: 2025-04-24

## 2025-04-24 NOTE — PATIENT INSTRUCTIONS
"Should you want to get in touch with your provider, KAYKAY Ponce, please contact MY Medical Assistant, Alondra, directly at [phone].  Recommend saving Alondra's direct number in phone as this is the PREFERRED & EASIEST way to get in contact with your provider.  Please leave a voice mail if you do not get an answer and she will return your call within 24 hrs. You will NOT be able to contact provider on [employer], as Behavioral Health Providers are restricted. YOU MUST CALL [phone]  If you need to speak with the on call provider after hours or on weekends, please Contact the [facility] ([phone]) and staff will be able to page the provider on call directly.     SPECIFIC RECOMMENDATIONS:     1.      Medications discussed at this encounter:                   -  plan to start Adderall XR 10 mg     2.      Psychotherapy recommendations: Continue with current therapist.      3.     Return to clinic: 4 weeks    Please arrive at least 15 minutes before your scheduled appointment time to complete check in process.      IF you are scheduled for a [employer] VIDEO visit, YOU MUST COMPLETE THE \"E-CHECK IN\" PROCESS PRIOR TO BEGINNING THE VISIT, You may still complete the E-Check in for in office visits prior to appointment, you will receive multiple text/email reminders which will direct you further if needed.            "

## 2025-04-24 NOTE — PROGRESS NOTES
"Subjective   Brenda Garcia is a 19 y.o. female who presents today for follow up    Referring Provider:  No referring provider defined for this encounter.    Chief Complaint:  anxiety, depression, ADHD, CSA signed, high risk medication, medication management, medication care plan discussed    Answers submitted by the patient for this visit:  Problem not listed (Submitted on 4/23/2025)  Chief Complaint: Other medical problem  Reason for appointment: controlled substance agreement  anorexia: No  joint pain: No  change in stool: No  joint swelling: No  swollen glands: No  vertigo: No  visual change: No  Onset: at an unknown time  Chronicity: recurrent  Frequency: constantly  Medications tried: none  Additional information: just adhd    History of Present Illness:    4/24/25:  Patient presents today in office to discuss alternative options for management of ADHD, as patient voiced at last visit of no longer smoking marijuana.  At last visit Wellbutrin XL was decreased from 450 mg to 300 mg due to suspected worsened anxiety.     At times patient will feel down, and goes to sleep then wakes up and is fine.  Has noticed she is easily irritated, angered with minuscule things-hit elbow on keyboard, computer not working correctly, scale is beeping alerting her the money she is counting will become easily angered though try's to not let it impair the rest of the day, though sometimes it will, though has been practicing coping strategies learned with therapist.     Patient reports anxiety is about the same, \"not having any breakdowns, its more the depression comes out of nowhere.\" Has been going to sleep at 7pm and wakes at 530 am for work. Which patient attributes to schedule change, working 6 days per week for the last 6 weeks.     Patient continues to remain free of marijuana use and does wish to start a stimulant.     ADHD:  Symptoms include inattention, need for frequent task redirection, forgetfulness, careless " "mistakes, losing things, and avoiding mental tasks. The symptoms occur at home and at work. The symptoms are described as Moderate in severity. The symptoms are described as unchanged. Symptoms are exacerbated by work environment, fatigue, and distracting activities. Symptoms are relieved by attention holding activities though not completely. Associated symptoms include anxiety disorder and major depression. Current treatment includes behavior modification, educational interventions, and Wellbutrin  mg. By report, Brenda is compliant all of the time.      4/11/25:   Answers submitted by the patient for this visit:  Anxiety (Submitted on 4/7/2025)  Chief Complaint: Anxiety  Visit: follow-up  Frequency: constantly  Severity: severe  depressed mood: Yes  dry mouth: No  excessive worry: Yes  insomnia: Yes  irritability: Yes  malaise/fatigue: Yes  obsessions: Yes  Sleep quality: fair  Hours of sleep per night: 7 Hours  Aggravated by: family issues, social activities, work stress  Medication compliance: %  Side effects: sexual problems    Patient presents today in office at last visit Wellbutrin XL was increased from 300 to 450 mg daily, for which patient reports not doing well with higher dose.   Patient reports calling MA on direct line on 3/27/25 that she wasn't doing well, the medicine hasn't done anything for me, I feel I been getting a little worse.\" Explains she is more anxious, having \"melt downs\", will start fanning hands when feeling overwhelmed, could occur while putting groceries away and being asked to do something else which may be a small task, and will start crying. Having difficulty regulating emotions, has tried to implement breathing exercises, though still starts to cry. Patient did get a new fidget toy she uses, will hug self tight to help calm self, which helps.     Patient is still taking the Wellbutrin  mg, however, feels the Lexapro is ineffective, possibly causing more " anxiety, and had weaned self off with the 10 mg old dose and denied withdrawal symptoms, which was approximately 2 weeks ago.     Patient continues to participate in therapy.    Patient did stop smoking marijuana 1 month ago and 4 days ago and wanted to try an ADHD medication. Patient does report the first 2 weeks felt more irritable, something's have been harder, sleep and dreams returning, feels anxious in dream and wakes up anxious, dreams about past trauma. Wellbutrin increased dose at last visit has been ineffective in management of ADHD symptoms.     BP Readings from Last 3 Encounters:   04/11/25 118/80   04/07/25 126/82   02/27/25 122/80     Pulse Readings from Last 3 Encounters:   04/11/25 84   04/07/25 104   02/27/25 98     Wt Readings from Last 3 Encounters:   04/11/25 82.4 kg (181 lb 9.6 oz)   04/07/25 80.9 kg (178 lb 6.4 oz)   02/27/25 82.8 kg (182 lb 9.6 oz)       2/27/25:    Answers submitted by the patient for this visit:  Anxiety (Submitted on 2/27/2025)  Chief Complaint: Anxiety  Visit: follow-up  Frequency: constantly  Severity: severe  depressed mood: Yes  dry mouth: No  excessive worry: Yes  insomnia: Yes  irritability: Yes  malaise/fatigue: No  obsessions: Yes  Sleep quality: fair  Hours of sleep per night: 8 Hours  Aggravated by: family issues  Medication compliance: %  Side effects: nausea    Patient presents today in office for further assessment of overlapping symptoms presented at last visit.     Patient does admit to struggling with anxiety the most, which further makes other things worse.    Spending excess amount of money for 2 weeks, then later tells herself to snap out of it, and will do good then it comes back. One day will decide to go shopping or go online and buy without thinking, doesn't realize the purchase was not needed until she see's her bank account. Uses linked credit card to [employer] or if it is a big purchase, though sometimes will use [employer] for the cash back  "percentage on other purchases. Will find self during this time speeding but no higher than  mph.  Patient had lost drivers license and got it renewed and is more cautious about ensuring she is following the rules of the road, as patient has old drivers license which is , other times during these episodes feels she can get away with getting pulled over, do whatever you want you will be fine, don't think about the consequences.\"Plans to go to [facility] soon.  Denies sleep deprivation, has more energy while out doing things, though loves to sleep.  No one has told her she was out of character during that time.  Feels boyfriend would recognize, and has said stuff before about spending money he will ask do you know how much your spending.  Patient does pay bills on time, as patient gets anxious about bills fears she would forget to pay which would cause disappointment.     Impulsivity:   At work used to do internet orders, though would overwhelm self with the steps to do the job, \"I should have got a cart first, forgets basic steps, thinks about I should have done x,y, z first and thought about that first.\"  Food impulsively eats something, and didn't think about other meals, not planned out accordingly.    Easily distracted, often forgets what she is doing, takes awhile to get back on track with several things.     Last year took an abnormal psychology class and felt she met criteria for adhd or bipolar 2 and told mom, and needed to schedule an appointment. Best friend had asked if she had ever been tested for ADHD.     ADHD:   Elementary school:   Grades: struggled with math, and really struggled 5th grade and thereafter, got first C that year and had to do school on Saturday; C's maybe a B once in math  Special classes or failures: No (only in college)  Got in trouble: No  Referral for ADHD testing: No \"they just thought I used all that energy on sports, went through several sports, soccer. everyone just called " "me loud and hyper, just a kid being a kid.\"  Fhx: none known, though suspects sister and cousin-family doesn't believe   Presently:  Problems with attention to detail: Yes (frequently submits incomplete work, goes back to fix mistakes, overlooks or misses details, work is inaccurate) was also difficult during school  Problems with sustained attention: Yes (Difficulty remaining focused during lectures-did record a few lectures pearl year of college due to great difficulty, day dreams a lot, would try to force self to listen, conversations, lengthy reading-did some in class if peaked interest, would try though as reading unable to understand what she read  Problems listening when spoken to directly: Yes (mind seems elsewhere, even in the absences of any obvious distraction)   Failure to finish tasks: Yes happens a lot at work and home, will start on task intended, then original task never completed, and dad will text her telling her once again you forgot to complete.  Avoids/Dislikes/Reluctant to engage in tasks that require sustained mental effort: Yes (schoolwork, homework,preparing reports,completing forms, reviewing lengthy papers) procrastinates   Easily distracted: Yes (unrelated thoughts, external factors) day dreams often  Forgetting things: Yes (daily chores, running errands, returning calls,, keeping appointments) able to remember to pay bills timely; went to work this morning and was not scheduled despite following routine to go to work while clocking in she received a message asking if she was still wanting to clock in as patient was not on the schedule; forgot to turn off car and in store for 1.5 hrs and thought she lost her keys-had several people looking and found the keys in car while vehicle still running-which had a regular key in the ignition.   Losing things: Yes (school materials, student ID-5-6 times, wallets, keys, cell phone, last year she got a new bank card 9 times, has lost drivers license " "multiple times)  Hard to organize: Yes difficulty managing sequential tasks, keeping materials and belongings in order, messy, disorganized work, poor time management, fails to meet deadlines at times sometimes missed assignments, if boss at work gave deadline is able to get it done but if gave self a deadline will not meet, keeps pushing it off, poor planning, prioritizing)   Talks a lot and cutting people off:Yes \"I talk so much, its all I really do, I used to cut people off, but when I was younger I learned it was rude, I actively try to make an effort to not cut people off.\" Voted most  talkative senior yr of [employer]  Drifts off during conversations: Yes  Difficulty with Reading: Yes, has tried audio books though finds it boring, re-reads  Xiii. Difficulty watching TV/Movies: Yes, easily distracted and bored, needs to do another task to listen to a show or movie- will be coloring while watching otherwise does not follow what is going on and has to rewind, turn on captions.    Several inattentive or hyperactive-impulsive symptoms are present in 2 or more settings (home,work, school, with friends or relatives, in other activities).  Clear evidence the symptoms interfere with, or reduce the quality of, social, academic, or occupational functioning.        2/11/25:  INITIAL EVAL  Answers submitted by the patient for this visit:  Depression (Submitted on 2/9/2025)  Chief Complaint: Depression  Visit: initial  Onset: 1 to 5 years ago  Progression since onset: worse  Frequency: constantly  Severity: severe  excessive worry: Yes  insomnia: Yes  irritability: Yes  malaise/fatigue: Yes  obsessions: Yes  hypersomnia: Yes  difficulty controlling mood: Yes  difficulty staying asleep: Yes  difficulty falling asleep: Yes  Hours of sleep per night: 6 Hours  Aggravated by: nothing, family issues, social activities    Provider introduced self, as well as credentials, and informed of provider role which will primarily include " "medication management of mental health conditions. Explained the difference between provider role vs. therapy/counseling services which include CBT (talk therapy) and do not include management of medications which are typically 45 minutes to 1 hr in length, however, if patient was in agreement to start therapy this provider can provide a contact list and/or refer. Patient verbalized understanding and agreed to proceed.    Patient presents today in office with a history of anxiety and depression for which treatment began in the 6th grade.  Patient is currently prescribed Wellbutrin  mg every morning ( mg twice daily 2/2022-6/2023, then changed to  mg formulation) and Lexapro 20 mg every morning (9/17/24 10 mg, increased to 20 mg 11/6/24), which have provided effectiveness, though feels medications are not as effective as they were previously.  Patient denies significant PMHX.    Patient goal of treatment \"I want to get into a mindset where I wake up everyday, and don't think I am running out time, I want my mind to stop, peace and tranquility.\"    Patient has noticed physical symptoms of anxiety- shaky, sweaty, stutters, nausea, vomits. And will also find self getting up in the middle of the night due to not eating much during the day and binge eating. Denies purging behavior.    Anxiety: goes to work, to bed on time, getting up on time. Patient has tried to focus on a healthier diet, though it stresses her out, then ends up binging. Patient enjoys job at [employer], gets to work with boyfriend and best friend, though doesn't understand why she gets so anxious.  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  When patient is at [employer] house will shut self in room and avoids going out to open areas by herself as his parents are in the home.  Patient has been with boyfriend for over 2 yrs and reports his family has treated her with respect, loving family whom " "have welcomed patient into their lives. They have went on vacations, family gatherings, and feels she should be comfortable eating around them, helping herself to fridge, etc while in their home, but isn't.    Symptoms started approximately her sophomore year of college, 2.5 yrs ago. Patient lived on campus at [facility] and was excited about going. 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  I think he really messed me up, he did a number on my self worth.\" He was from [facility], his name is Kit, and his beauty standards are \"tiny women\", a month after they stared dating would say, \"your gonna wear that or eat that, and stop eating a week before we go to his mom's.\" He was patient first official relationship. \"I gave him my first everything.\" Explains his mother would say similar things, and felt judged on how much she ate or what she wore.  Dolores break up, as patient started realizing she was not happy. Best friend would question why patient would allow him and his mother to speak to her in such a manner. \"I was scared to break up with him.\"    Scared of being judged. Especially around food, always go back to food, boyfriend name is Gallo. Will hide bag of food, drinks or he will carry food. Will not eat in front of his parents, will take 2 bites and then feels sick at her stomach.    Bed weight of 194 lbs was at the ED and bed may have not been zeroed out prior to weight due to fluctuation value noted.   Wt Readings from Last 3 Encounters:   02/11/25 83.2 kg (183 lb 6.4 oz)   01/15/25 88.3 kg (194 lb 10.7 oz)   11/06/24 82.3 kg (181 lb 6.4 oz)     \"Physical feeling in brain not feeling fine for so long, something off.  Spending-I usually am a really anxious spender, but recently I want this and I will get this without 2nd thought, I am not saving money at all. Thoughts I feel I would never " "have, why am I here, too scared to kill self; feel life is too short; do you really have enough time to do that. Everyday is constantly going back and forth with self in head. Self doubt. Simple tasks-prioritizing, then hasn't done anything productive. Loses sense of time. Forget a lot more than used too. Eye doctor appointment I thought it was yesterday but its 21st.\" Symptoms present for almost 2 yrs.     If others don't use turn signal while driving, if stuff isn't done the way it is supposed to be done then this bad thing will happen. I've only felt this way for the last 2.5 yrs. I feel I am always weighed down, I can think of things Kit said and I am instantly sick and will shut down.\"     \"Lexapro has helped, but also feels it not like it used to and now I am back at square 1. Routine is the same. Not doing what it should be doing. Possible ADHD?\"    Psych: Eating Disorder The patient admits to binge eating episodes, described as occurring nearly everyday, eating excessive amount of calories at one time in secret .    PTSD:  Persistent and exaggerated negative beliefs or expectations about oneself, others, or the world (e.g., \"I am bad,\" \"No one can be trusted,\" \"The world is completely dangerous,\" \"My whole nervous system is permanently ruined\"), Persistent, distorted cognitions about the cause or consequences of the traumatic event(s) that lead the individual to blame himself/herself or others, Persistent negative emotional state (e.g., fear, horror, anger, guilt, or shame), Markedly diminished interest or participation in significant activities, and Persistent inability to experience positive emotions (e.g., inability to experience happiness, satisfaction, or loving feelings)  Intrusive thoughts of trauma, Triggers present, and Intense psychologic distress    Depression: Patient complains of depression. She complains of anhedonia, depressed mood, difficulty concentrating, fatigue, feelings of " worthlessness/guilt, hopelessness, and insomnia     Anxiety:  The patient endorses to the following symptoms of anxiety including: excessive anxiety and worry about a number of events or activities for more days than not, restlessness or feeling keyed up, being easily fatigued, difficulty concentrating or mind going blank, irritability, and sleep disturbance which have caused impairment in important areas of daily functioning.      PHQ-9 Depression Screening  PHQ-9 Total Score:  2/9/2025 23   The PHQ has not been completed during this encounter.          BETI-7   2/9/2025 21    The following portions of the patient's history were reviewed and updated as appropriate: allergies, current medications, past family history, past medical history, past social history, and past surgical history.     Past Surgical History:  Past Surgical History:   Procedure Laterality Date    WISDOM TOOTH EXTRACTION Bilateral        Problem List:  Patient Active Problem List   Diagnosis    Anxiety and depression    Encounter for surveillance of vaginal ring hormonal contraceptive device    Screening for viral disease    Immunization due    Routine gynecological examination       Allergy:   No Known Allergies     Discontinued Medications:  There are no discontinued medications.        Current Medications:   Current Outpatient Medications   Medication Sig Dispense Refill    buPROPion XL (WELLBUTRIN XL) 300 MG 24 hr tablet Take 1 tablet by mouth Every Morning. TAKE WITH 150 MG TO EQUAL 450 MG TOTAL  Indications: Attention Deficit Hyperactivity Disorder, Major Depressive Disorder 90 tablet 0    dicyclomine (BENTYL) 20 MG tablet Take 1 tablet by mouth Every 6 (Six) Hours. 20 tablet 0    etonogestrel-ethinyl estradiol (EnilloRing) 0.12-0.015 MG/24HR vaginal ring Insert vaginally and leave in place for 3 consecutive weeks, then remove for 1 week. 9 each 3    ondansetron ODT (ZOFRAN-ODT) 4 MG disintegrating tablet Place 1 tablet on the tongue Every 8  "(Eight) Hours As Needed for Nausea or Vomiting. 15 tablet 0    pantoprazole (PROTONIX) 40 MG EC tablet Take 1 tablet by mouth Daily. 90 tablet 1     No current facility-administered medications for this visit.       Past Medical History:  Past Medical History:   Diagnosis Date    Anxiety     Depression     Self-injurious behavior        Past Psychiatric History:  Began Treatment: 6th grade  Diagnoses:Depression and Anxiety  Psychiatrist:Denies  Therapist: in highschool  Admission History:Denies  Medication Trials: Wellbutrin  mg twice daily 2022-2023 due to keeping up with 2 times daily dosing,then switched to  mg; Lexapro up to 20 mg 2024-end of 3/2025 due to ineffectiveness, worsened anxiety which could have been related to Wellbutrin XL which was increased from 300 to 450 mg 2025    Self Harm:  \"6th grade one time, I was depressed and remember I watched a [employer] video and she said it helped\"    Suicide Attempts:Denies   Psychosis, Anxiety, Depression:  n/a    Substance Abuse History:   Types: smokes marijuana daily, vapes, flour, and edibles   AS OF 23 stopped using Marijuana 1 month ago and 4 days ago   Withdrawal Symptoms:Denies  Longest Period Sober:Not Applicable   AA: Not applicable   Legal: none    Social History:   Martial Status:Boyfriend   Employed:Yes and If so, where [employer] working 530am-230pm  Kids:No  House:Lives in a house with parents and brother & sister   History: Denies  Access to Guns:  Yes, used to be locked but cleaning currently-old shot gun, chace guns    Social History     Socioeconomic History    Marital status: Significant Other    Number of children: 0    Highest education level: Bachelor's degree (e.g., BA, AB, BS)   Tobacco Use    Smoking status: Never     Passive exposure: Past    Smokeless tobacco: Never   Vaping Use    Vaping status: Never Used   Substance and Sexual Activity    Alcohol use: Yes     Comment: socially    Drug use: Not " Currently     Types: Marijuana     Comment: last Marijuana use 3/7/25 approx. updated 4/11/25.    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 Negative for chills, diaphoresis and fever.   HENT:  Negative for congestion and sore throat.    Respiratory:  Negative for cough and shortness of breath.    Cardiovascular:  Negative for chest pain and palpitations.   Gastrointestinal:  Negative for abdominal pain, nausea and vomiting.   Genitourinary:  Negative for dysuria.   Musculoskeletal:  Negative for myalgias and neck pain.   Skin:  Negative for rash.   Neurological:  Negative for dizziness, seizures, weakness, numbness and headaches.   Psychiatric/Behavioral:  Positive for decreased concentration and sleep disturbance. Negative for confusion, self-injury and suicidal ideas. The patient  "is nervous/anxious. The patient is not hyperactive.          Physical Exam:  Physical Exam  Psychiatric:         Attention and Perception: Attention and perception normal.         Mood and Affect: Mood is anxious and depressed.         Speech: Speech normal.         Behavior: Behavior normal. Behavior is cooperative.         Thought Content: Thought content normal. Thought content does not include suicidal ideation. Thought content does not include suicidal plan.         Cognition and Memory: Cognition and memory normal.         Judgment: Judgment normal.      Comments: Improving, mild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

## 2025-04-24 NOTE — TELEPHONE ENCOUNTER
"Called patient to inform of positive UDS for THC and a confirmation level has been ordered, which can take up to 7 days to result, informed patient once received she will be contacted, until results are confirmed as negative, the Adderall XR will not be ordered. Patient verbalized understanding, informed patient due to daily THC use it can take over 30 days to no longer be detected on a UDS. \"I was so upset when I saw that.\" Assured patient her credibility was not reflected, and sometimes there are false positives, and would call patient once received confirmation.   "

## 2025-04-24 NOTE — TELEPHONE ENCOUNTER
REFILL REQUEST:     buPROPion XL (WELLBUTRIN XL) 300 MG 24 hr tablet (02/27/2025)     F/UP- TODAY AT 10:40 AM.  LOV: 04/11/2025.

## 2025-04-24 NOTE — PROGRESS NOTES
"Progress Note    Date: 2025  Time In: 11:00  Time Out: 11:56    Patient Legal Name: Bernda Garcia  Patient Age: 19 y.o.    Mode of visit: In person  Location of provider: [address], [place] [address]  Location of patient: Office      CHIEF COMPLAINT: anxiety,  depression, binge-eating     Subjective   History of Present Illness   Brenda \"Stephanie\" is a 19 y.o. female who presents today as a follow-up for continued psychotherapy. Patient reported she is \"doing good and having a lot of fun.\"  Patient stated she is looking for a new job because her hours have been cut.  Patient shared she wants to work on her \"anger issues\" and explained what she has been doing to manage (silent scream, calling herself out, rethinking the situation). Patient advised she used positive self-talk to reduce her anxiety about her recent screening and making the best of her living situation.  Patient reported she has decided to be productive in her off time from work.  Patient shared she has had a \"good 2 weeks\" and has not had any low times. Patient described a situation where she advocated for herself at work and it boosted her self-confidence. Patient is voluntarily requesting to participate in outpatient therapy at [facility].      History obtained from referring provider's note on 25:  Past Psychiatric History:  Began Treatment: 6th grade  Diagnoses:Depression and Anxiety  Psychiatrist:Denies  Therapist: in highschool  Admission History:Denies  Medication Trials: Wellbutrin  mg twice daily 2022-2023 due to keeping up with 2 times daily dosing,then switched to  mg    Self Harm:  \"6th grade one time, I was depressed and remember I watched a [employer] video and she said it helped\"    Suicide Attempts:Denies   Psychosis, Anxiety, Depression:  n/a    Assessment    Mental Status Exam     Appearance: good hygiene and dressed appropriately for the " weather  Behavior: calm  Cooperation:  engaged, cooperative, attentive, and friendly  Eye Contact:  good  Affect:  bright  Mood: expressive  Speech: talkative  Thought Process:  organized  Thought Content: appropriate  Suicidal: denies  Homicidal:  denies  Hallucinations:  denies  Memory:  intact  Orientation:  person, place, time, and situation  Reliability:  reliable  Insight:  good  Judgment:  good    Clinical Intervention       ICD-10-CM ICD-9-CM   1. Generalized anxiety disorder  F41.1 300.02   2. Major depressive disorder, recurrent episode, moderate  F33.1 296.32   3. Binge eating disorder, moderate  F50.811 307.59        Individual psychotherapy was provided utilizing CBT and person-centered techniques to restore adaptive functioning, provide symptom relief, build rapport, encourage expression of thoughts and feelings, support self-esteem, establish new coping skills, manage stress, acknowledge sources of feelings and behaviors, build confidence, assess symptoms, recognize cognitive distortions, and provide psychoeducation. Coping skills were enhanced to build distress tolerance skills and emotional regulation. Therapist utilized open-ended questions to encourage the development of a positive therapeutic relationship and open communication.  Therapist normalized/validated patient’s thoughts and feelings as appropriate. Gave weekly scheduling template to help with her plan to productively utilize her off time. Praised patient for utilization of skills learned in session to reduce anxiety and depression symptoms (positive self-talk, reframing, adjusting expectations, problem-solving). Gave patient an article regarding emotional regulation and more affirmations.    Plan   Plan & Goals     Moving forward, we will continue to build rapport and reinforce and build upon effective coping strategies utilizing CBT and person-centered techniques.Discuss emotional regulation article and follow up on progress with being  more active.    Patient acknowledged and verbally consented to continue working toward resolving current treatment plan goals and was educated on the importance of participation in the therapeutic process.  Patient will remain compliant with medication regimen as prescribed. Discuss any medication side effects, questions or concerns with prescribing provider.  Call 911 or present to the nearest emergency room in an emergency situation.  National Suicide Prevention Lifeline: Call 988. The Lifeline provides 24/7, free and confidential support for people in distress, prevention and crisis resources.  Crisis Text Line  Text HOME To 997735    Return in about 2 weeks (around 5/12/2025).    ____________________  This document has been electronically signed by Juana Templeton LCSW  April 28, 2025 12:08 EDT    Part of this note may be an electronic transcription/translation of spoken language to printed text using the Dragon Dictation System.

## 2025-04-28 ENCOUNTER — OFFICE VISIT (OUTPATIENT)
Age: 20
End: 2025-04-28
Payer: COMMERCIAL

## 2025-04-28 DIAGNOSIS — F50.811 BINGE EATING DISORDER, MODERATE: ICD-10-CM

## 2025-04-28 DIAGNOSIS — F41.1 GENERALIZED ANXIETY DISORDER: Primary | ICD-10-CM

## 2025-04-28 DIAGNOSIS — F33.1 MAJOR DEPRESSIVE DISORDER, RECURRENT EPISODE, MODERATE: ICD-10-CM

## 2025-04-28 PROCEDURE — 90837 PSYTX W PT 60 MINUTES: CPT | Performed by: SOCIAL WORKER

## 2025-04-29 ENCOUNTER — TELEPHONE (OUTPATIENT)
Dept: FAMILY MEDICINE CLINIC | Age: 20
End: 2025-04-29
Payer: COMMERCIAL

## 2025-04-29 ENCOUNTER — LAB (OUTPATIENT)
Dept: LAB | Facility: HOSPITAL | Age: 20
End: 2025-04-29
Payer: COMMERCIAL

## 2025-04-29 DIAGNOSIS — Z01.419 ROUTINE GYNECOLOGICAL EXAMINATION: ICD-10-CM

## 2025-04-29 LAB
ALBUMIN SERPL-MCNC: 4 G/DL (ref 3.5–5.2)
ALBUMIN/GLOB SERPL: 1.3 G/DL
ALP SERPL-CCNC: 48 U/L (ref 39–117)
ALT SERPL W P-5'-P-CCNC: 23 U/L (ref 1–33)
ANION GAP SERPL CALCULATED.3IONS-SCNC: 11.2 MMOL/L (ref 5–15)
AST SERPL-CCNC: 24 U/L (ref 1–32)
BASOPHILS # BLD AUTO: 0.03 10*3/MM3 (ref 0–0.2)
BASOPHILS NFR BLD AUTO: 0.4 % (ref 0–1.5)
BILIRUB SERPL-MCNC: 0.2 MG/DL (ref 0–1.2)
BUN SERPL-MCNC: 8 MG/DL (ref 6–20)
BUN/CREAT SERPL: 14.5 (ref 7–25)
CALCIUM SPEC-SCNC: 8.8 MG/DL (ref 8.6–10.5)
CANNABINOIDS UR QL CFM: 11 NG/ML
CANNABINOIDS UR QL CFM: POSITIVE
CHLORIDE SERPL-SCNC: 107 MMOL/L (ref 98–107)
CHOLEST SERPL-MCNC: 198 MG/DL (ref 0–200)
CO2 SERPL-SCNC: 20.8 MMOL/L (ref 22–29)
CREAT SERPL-MCNC: 0.55 MG/DL (ref 0.57–1)
DEPRECATED RDW RBC AUTO: 39.8 FL (ref 37–54)
EGFRCR SERPLBLD CKD-EPI 2021: 133.9 ML/MIN/1.73
EOSINOPHIL # BLD AUTO: 0.07 10*3/MM3 (ref 0–0.4)
EOSINOPHIL NFR BLD AUTO: 0.9 % (ref 0.3–6.2)
ERYTHROCYTE [DISTWIDTH] IN BLOOD BY AUTOMATED COUNT: 12.5 % (ref 12.3–15.4)
GLOBULIN UR ELPH-MCNC: 3.1 GM/DL
GLUCOSE SERPL-MCNC: 82 MG/DL (ref 65–99)
HCT VFR BLD AUTO: 38.5 % (ref 34–46.6)
HDLC SERPL-MCNC: 50 MG/DL (ref 40–60)
HGB BLD-MCNC: 12.6 G/DL (ref 12–15.9)
IMM GRANULOCYTES # BLD AUTO: 0.01 10*3/MM3 (ref 0–0.05)
IMM GRANULOCYTES NFR BLD AUTO: 0.1 % (ref 0–0.5)
LABORATORY COMMENT REPORT: ABNORMAL
LDLC SERPL CALC-MCNC: 128 MG/DL (ref 0–100)
LDLC/HDLC SERPL: 2.52 {RATIO}
LYMPHOCYTES # BLD AUTO: 3.26 10*3/MM3 (ref 0.7–3.1)
LYMPHOCYTES NFR BLD AUTO: 43.6 % (ref 19.6–45.3)
MCH RBC QN AUTO: 27.8 PG (ref 26.6–33)
MCHC RBC AUTO-ENTMCNC: 32.7 G/DL (ref 31.5–35.7)
MCV RBC AUTO: 85 FL (ref 79–97)
MONOCYTES # BLD AUTO: 0.34 10*3/MM3 (ref 0.1–0.9)
MONOCYTES NFR BLD AUTO: 4.5 % (ref 5–12)
NEUTROPHILS NFR BLD AUTO: 3.77 10*3/MM3 (ref 1.7–7)
NEUTROPHILS NFR BLD AUTO: 50.5 % (ref 42.7–76)
PLATELET # BLD AUTO: 241 10*3/MM3 (ref 140–450)
PMV BLD AUTO: 10.3 FL (ref 6–12)
POTASSIUM SERPL-SCNC: 3.9 MMOL/L (ref 3.5–5.2)
PROT SERPL-MCNC: 7.1 G/DL (ref 6–8.5)
RBC # BLD AUTO: 4.53 10*6/MM3 (ref 3.77–5.28)
SODIUM SERPL-SCNC: 139 MMOL/L (ref 136–145)
TRIGL SERPL-MCNC: 110 MG/DL (ref 0–150)
TSH SERPL DL<=0.05 MIU/L-ACNC: 0.89 UIU/ML (ref 0.27–4.2)
VLDLC SERPL-MCNC: 20 MG/DL (ref 5–40)
WBC NRBC COR # BLD AUTO: 7.48 10*3/MM3 (ref 3.4–10.8)

## 2025-04-29 PROCEDURE — 80061 LIPID PANEL: CPT

## 2025-04-29 PROCEDURE — 80053 COMPREHEN METABOLIC PANEL: CPT

## 2025-04-29 PROCEDURE — 85025 COMPLETE CBC W/AUTO DIFF WBC: CPT

## 2025-04-29 PROCEDURE — 84443 ASSAY THYROID STIM HORMONE: CPT

## 2025-04-29 PROCEDURE — 36415 COLL VENOUS BLD VENIPUNCTURE: CPT

## 2025-05-07 NOTE — PROGRESS NOTES
"Progress Note    Date: 05/12/2025  Time In: 8:00  Time Out: 8:58    Patient Legal Name: Brenda Garcia  Patient Age: 19 y.o.    Mode of visit: In person  Location of provider: [address], [place] [address]  Location of patient: Office      CHIEF COMPLAINT: anxiety, depression, binge-eating     Subjective   History of Present Illness   Brenda \"Stephanie\" is a 19 y.o. female who presents today as a follow-up for continued psychotherapy. Patient reported her dad has issued an ultimatum to the kids demanding rent, etc, from all of the children.  Patient stated she had a breakdown and her boyfriend helped her through it.  Patient shared she and her boyfriend are making plans to move out. Patient advised since then her dad (through working with his therapist) has been improving communication.  Patient reported she is trying to be more frugal.  Patient shared her plans for moving out (chores list, division of payments, etc.). Patient stated she is applying for new jobs. \"I'm so ready for change.\" Patient stated she is doing more house cleaning. Patient explained she is being mindful and patient with the job process. Patient expressed excitement about getting a new job. Patient expressed feeling more confident over all and being more in control of her eating. Patient is preparing for the interview. Patient stated she has been out of Wellbutrin for over a week and has been feeling more depressed. Patient reported she forgot to read the article provided in last session. Patient is voluntarily requesting to participate in outpatient therapy at [facility]. Patient rated anxiety at 1-2 and depression at 5 today on a 0-10 scale where 0= no symptoms.      History obtained from referring provider's note on 2/11/25:  Past Psychiatric History:  Began Treatment: 6th grade  Diagnoses:Depression and Anxiety  Psychiatrist:Frediies  Therapist: in highschool  Admission History:Denies  Medication " "Trials: Wellbutrin  mg twice daily 2022-2023 due to keeping up with 2 times daily dosing,then switched to  mg    Self Harm:  \"6th grade one time, I was depressed and remember I watched a [employer] video and she said it helped\"    Suicide Attempts:Denies   Psychosis, Anxiety, Depression:  n/a    Patient actively participates in sessions and seems open to therapeutic interventions introduced. Patient reports utilizing coping strategies presented in sessions and appears to be calmer and less stressed recently.     Assessment    Mental Status Exam     Appearance: good hygiene and dressed appropriately for the weather  Behavior: calm  Cooperation:  engaged, cooperative, attentive, and friendly  Eye Contact:  good  Affect:  bright  Mood: expressive  Speech: talkative  Thought Process:  organized  Thought Content: appropriate  Suicidal: denies  Homicidal:  denies  Hallucinations:  denies  Memory:  intact  Orientation:  person, place, time, and situation  Reliability:  reliable  Insight:  good  Judgment:  good    Clinical Intervention       ICD-10-CM ICD-9-CM   1. Generalized anxiety disorder  F41.1 300.02   2. Major depressive disorder, recurrent episode, moderate  F33.1 296.32   3. Binge eating disorder, moderate  F50.811 307.59        Individual psychotherapy was provided utilizing CBT and person-centered techniques to promote problem-solving, provide symptom relief, build rapport, encourage expression of thoughts and feelings, support self-esteem, establish new coping skills, promote emotion regulation, manage stress, identify triggers, recognize patterns of behavior, acknowledge sources of feelings and behaviors, build confidence, assess symptoms, challenge negative thinking patterns, establish therapeutic alliance, and provide psychoeducation.  Therapist utilized open-ended questions to encourage the development of a positive therapeutic relationship and open communication.  Therapist " normalized/validated patient’s thoughts and feelings as appropriate. Discussed the pros and cons of having her sister move in with her and her boyfriend. Patient rated anxiety at 1-2 and depression at 5 today on a 0-10 scale where 0= no symptoms. Reviewed highlights of emotion regulation article from last session. Provided follow up article on emotional regulation and one regarding behavioral activation for review at next session.    Plan   Plan & Goals     Moving forward, we will continue to build rapport and reinforce and build upon effective coping strategies utilizing CBT and person-centered techniques. Review articles provided today.    Patient acknowledged and verbally consented to continue working toward resolving current treatment plan goals and was educated on the importance of participation in the therapeutic process.  Patient will remain compliant with medication regimen as prescribed. Discuss any medication side effects, questions or concerns with prescribing provider.  Call 911 or present to the nearest emergency room in an emergency situation.  National Suicide Prevention Lifeline: Call 988. The Lifeline provides 24/7, free and confidential support for people in distress, prevention and crisis resources.  Crisis Text Line  Text HOME To 595287    Return in about 2 weeks (around 5/26/2025).    ____________________  This document has been electronically signed by Juana Templeton LCSW  May 12, 2025 09:14 EDT    Part of this note may be an electronic transcription/translation of spoken language to printed text using the Dragon Dictation System.

## 2025-05-12 ENCOUNTER — OFFICE VISIT (OUTPATIENT)
Age: 20
End: 2025-05-12
Payer: COMMERCIAL

## 2025-05-12 DIAGNOSIS — F50.811 BINGE EATING DISORDER, MODERATE: ICD-10-CM

## 2025-05-12 DIAGNOSIS — F41.1 GENERALIZED ANXIETY DISORDER: Primary | ICD-10-CM

## 2025-05-12 DIAGNOSIS — F33.1 MAJOR DEPRESSIVE DISORDER, RECURRENT EPISODE, MODERATE: ICD-10-CM

## 2025-05-19 ENCOUNTER — OFFICE VISIT (OUTPATIENT)
Dept: BEHAVIORAL HEALTH | Facility: CLINIC | Age: 20
End: 2025-05-19
Payer: COMMERCIAL

## 2025-05-19 ENCOUNTER — CLINICAL SUPPORT (OUTPATIENT)
Dept: FAMILY MEDICINE CLINIC | Age: 20
End: 2025-05-19
Payer: COMMERCIAL

## 2025-05-19 VITALS
SYSTOLIC BLOOD PRESSURE: 110 MMHG | HEART RATE: 104 BPM | WEIGHT: 182.4 LBS | BODY MASS INDEX: 35.81 KG/M2 | HEIGHT: 60 IN | DIASTOLIC BLOOD PRESSURE: 70 MMHG

## 2025-05-19 DIAGNOSIS — Z71.89 MEDICATION CARE PLAN DISCUSSED WITH PATIENT: ICD-10-CM

## 2025-05-19 DIAGNOSIS — F90.2 ATTENTION DEFICIT HYPERACTIVITY DISORDER, COMBINED TYPE: Primary | ICD-10-CM

## 2025-05-19 DIAGNOSIS — Z91.89 AT RISK FOR MEDICATION NONADHERENCE: ICD-10-CM

## 2025-05-19 DIAGNOSIS — Z79.899 HIGH RISK MEDICATION USE: ICD-10-CM

## 2025-05-19 DIAGNOSIS — R82.5 POSITIVE URINE DRUG SCREEN: ICD-10-CM

## 2025-05-19 DIAGNOSIS — F41.1 GENERALIZED ANXIETY DISORDER: ICD-10-CM

## 2025-05-19 DIAGNOSIS — Z79.899 MEDICATION MANAGEMENT: ICD-10-CM

## 2025-05-19 DIAGNOSIS — Z79.899 CONTROLLED SUBSTANCE AGREEMENT SIGNED: ICD-10-CM

## 2025-05-19 DIAGNOSIS — F33.1 MAJOR DEPRESSIVE DISORDER, RECURRENT EPISODE, MODERATE: ICD-10-CM

## 2025-05-19 DIAGNOSIS — F12.91 HISTORY OF MARIJUANA USE: ICD-10-CM

## 2025-05-19 DIAGNOSIS — Z76.89 ENCOUNTER BEFORE STARTING MEDICATION: ICD-10-CM

## 2025-05-19 PROCEDURE — G0480 DRUG TEST DEF 1-7 CLASSES: HCPCS | Performed by: NURSE PRACTITIONER

## 2025-05-19 RX ORDER — BUPROPION HYDROCHLORIDE 150 MG/1
TABLET ORAL
COMMUNITY
Start: 2025-05-12 | End: 2025-05-19

## 2025-05-19 NOTE — PROGRESS NOTES
"Subjective   Brenda Garcia is a 19 y.o. female who presents today for follow up    Referring Provider:  No referring provider defined for this encounter.    Chief Complaint:  ADHD, anxiety, depression, CSA signed, high risk medication, medication management, medication care plan discussed, encounter before starting medication, history of marijuana use, positive UDS    Answers submitted by the patient for this visit:  Problem not listed (Submitted on 5/19/2025)  Chief Complaint: Other medical problem  Reason for appointment: drug test for adderall  Onset: 1 to 6 months  Medications tried: therapy    History of Present Illness:    5/19/25:  Patient presents today in office at last visit planned to start patient on Adderall XR for management of ADHD, however, UDS confirmation of THC showed a level of \"11\", repeating UDS, pregnancy today as patient reported last marijuana use was 3/9/25. Patient reports she had a panic attack over the weekend, \"I was in my brain.\" Patient was thinking of college plan, and hasn't started masters as planned, though was able to get through it with positive thoughts.    Per chart review noted patient had reported to therapist at visit on 5/12/25 of being out of Wellbutrin  mg for approximately 1 week and was feeling more depressed, noted pharmacy had filled prescription dated 12/11/24 from PCP for #30/30 days, however, the 150 mg was noted as last dispensed in EHR 5/12/25 from prescription dated 2/27/25 per this provider, which was written for 90 days. Patient recalls while on phone with Pancho had requested before hearing the prescription number, though confirmed only taking 300 mg.  Patient was off Wellbutrin XL for approximately 1.5 weeks due to forgetting to fill. Did experience bad headaches, then started feeling more depressed, wanted to sleep and stay on couch.     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     Patient denies any further marijuana use, despite positive UDS for THC today, and when parents smoke they are in garage and patient is in the house in bedroom. Denies any passive exposure as boyfriend does not drink or use drugs.       ADHD:  Symptoms include impulsivity, inattention, need for frequent task redirection, and forgetfulness. The symptoms occur at home, at work, and during social events.  The symptoms are described as Moderate in severity. The symptoms are described as unchanged. Symptoms are exacerbated by work environment, fatigue, distracting activities, and conversation. Symptoms are relieved by attention holding activities. Associated symptoms include anxiety disorder and major depression. Current treatment includes behavior modification, a structured daily routine, educational interventions, individual therapy, and Wellbutrin  mg. By report, Brenda is compliant most of the time.    Denies side effects of elevated heart rate, elevated blood pressure, irritability, insomnia, decreased appetite, nor feeling shaky or jittery with stimulant medication.         4/24/25:    Answers submitted by the patient for this visit:  Problem not listed (Submitted on 4/23/2025)  Chief Complaint: Other medical problem  Reason for appointment: controlled substance agreement  anorexia: No  joint pain: No  change in stool: No  joint swelling: No  swollen glands: No  vertigo: No  visual change: No  Onset: at an unknown time  Chronicity: recurrent  Frequency: constantly  Medications tried: none  Additional information: just adhd    Patient presents today in office to discuss alternative options for management of ADHD, as patient voiced at last visit of no longer smoking marijuana.  At last visit Wellbutrin XL was decreased from 450 mg to 300 mg due to suspected worsened anxiety.     At times patient will feel down, and goes to sleep then wakes up and  "is fine.  Has noticed she is easily irritated, angered with minuscule things-hit elbow on keyboard, computer not working correctly, scale is beeping alerting her the money she is counting will become easily angered though try's to not let it impair the rest of the day, though sometimes it will, though has been practicing coping strategies learned with therapist.     Patient reports anxiety is about the same, \"not having any breakdowns, its more the depression comes out of nowhere.\" Has been going to sleep at 7pm and wakes at 530 am for work. Which patient attributes to schedule change, working 6 days per week for the last 6 weeks.     Patient continues to remain free of marijuana use and does wish to start a stimulant.     ADHD:  Symptoms include inattention, need for frequent task redirection, forgetfulness, careless mistakes, losing things, and avoiding mental tasks. The symptoms occur at home and at work. The symptoms are described as Moderate in severity. The symptoms are described as unchanged. Symptoms are exacerbated by work environment, fatigue, and distracting activities. Symptoms are relieved by attention holding activities though not completely. Associated symptoms include anxiety disorder and major depression. Current treatment includes behavior modification, educational interventions, and Wellbutrin  mg. By report, Brenda is compliant all of the time.      4/11/25:   Answers submitted by the patient for this visit:  Anxiety (Submitted on 4/7/2025)  Chief Complaint: Anxiety  Visit: follow-up  Frequency: constantly  Severity: severe  depressed mood: Yes  dry mouth: No  excessive worry: Yes  insomnia: Yes  irritability: Yes  malaise/fatigue: Yes  obsessions: Yes  Sleep quality: fair  Hours of sleep per night: 7 Hours  Aggravated by: family issues, social activities, work stress  Medication compliance: %  Side effects: sexual problems    Patient presents today in office at last visit " "Wellbutrin XL was increased from 300 to 450 mg daily, for which patient reports not doing well with higher dose.   Patient reports calling MA on direct line on 3/27/25 that she wasn't doing well, the medicine hasn't done anything for me, I feel I been getting a little worse.\" Explains she is more anxious, having \"melt downs\", will start fanning hands when feeling overwhelmed, could occur while putting groceries away and being asked to do something else which may be a small task, and will start crying. Having difficulty regulating emotions, has tried to implement breathing exercises, though still starts to cry. Patient did get a new fidget toy she uses, will hug self tight to help calm self, which helps.     Patient is still taking the Wellbutrin  mg, however, feels the Lexapro is ineffective, possibly causing more anxiety, and had weaned self off with the 10 mg old dose and denied withdrawal symptoms, which was approximately 2 weeks ago.     Patient continues to participate in therapy.    Patient did stop smoking marijuana 1 month ago and 4 days ago and wanted to try an ADHD medication. Patient does report the first 2 weeks felt more irritable, something's have been harder, sleep and dreams returning, feels anxious in dream and wakes up anxious, dreams about past trauma. Wellbutrin increased dose at last visit has been ineffective in management of ADHD symptoms.     BP Readings from Last 3 Encounters:   04/11/25 118/80   04/07/25 126/82   02/27/25 122/80     Pulse Readings from Last 3 Encounters:   04/11/25 84   04/07/25 104   02/27/25 98     Wt Readings from Last 3 Encounters:   04/11/25 82.4 kg (181 lb 9.6 oz)   04/07/25 80.9 kg (178 lb 6.4 oz)   02/27/25 82.8 kg (182 lb 9.6 oz)       2/27/25:    Answers submitted by the patient for this visit:  Anxiety (Submitted on 2/27/2025)  Chief Complaint: Anxiety  Visit: follow-up  Frequency: constantly  Severity: severe  depressed mood: Yes  dry mouth: No  excessive " "worry: Yes  insomnia: Yes  irritability: Yes  malaise/fatigue: No  obsessions: Yes  Sleep quality: fair  Hours of sleep per night: 8 Hours  Aggravated by: family issues  Medication compliance: %  Side effects: nausea    Patient presents today in office for further assessment of overlapping symptoms presented at last visit.     Patient does admit to struggling with anxiety the most, which further makes other things worse.    Spending excess amount of money for 2 weeks, then later tells herself to snap out of it, and will do good then it comes back. One day will decide to go shopping or go online and buy without thinking, doesn't realize the purchase was not needed until she see's her bank account. Uses linked credit card to [employer] or if it is a big purchase, though sometimes will use [employer] for the cash back percentage on other purchases. Will find self during this time speeding but no higher than  mph.  Patient had lost drivers license and got it renewed and is more cautious about ensuring she is following the rules of the road, as patient has old drivers license which is , other times during these episodes feels she can get away with getting pulled over, do whatever you want you will be fine, don't think about the consequences.\"Plans to go to [facility] soon.  Denies sleep deprivation, has more energy while out doing things, though loves to sleep.  No one has told her she was out of character during that time.  Feels boyfriend would recognize, and has said stuff before about spending money he will ask do you know how much your spending.  Patient does pay bills on time, as patient gets anxious about bills fears she would forget to pay which would cause disappointment.     Impulsivity:   At work used to do internet orders, though would overwhelm self with the steps to do the job, \"I should have got a cart first, forgets basic steps, thinks about I should have done x,y, z first and thought about that " "first.\"  Food impulsively eats something, and didn't think about other meals, not planned out accordingly.    Easily distracted, often forgets what she is doing, takes awhile to get back on track with several things.     Last year took an abnormal psychology class and felt she met criteria for adhd or bipolar 2 and told mom, and needed to schedule an appointment. Best friend had asked if she had ever been tested for ADHD.     ADHD:   Elementary school:   Grades: struggled with math, and really struggled 5th grade and thereafter, got first C that year and had to do school on Saturday; C's maybe a B once in math  Special classes or failures: No (only in college)  Got in trouble: No  Referral for ADHD testing: No \"they just thought I used all that energy on sports, went through several sports, soccer. everyone just called me loud and hyper, just a kid being a kid.\"  Fhx: none known, though suspects sister and cousin-family doesn't believe   Presently:  Problems with attention to detail: Yes (frequently submits incomplete work, goes back to fix mistakes, overlooks or misses details, work is inaccurate) was also difficult during school  Problems with sustained attention: Yes (Difficulty remaining focused during lectures-did record a few lectures pearl year of college due to great difficulty, day dreams a lot, would try to force self to listen, conversations, lengthy reading-did some in class if peaked interest, would try though as reading unable to understand what she read  Problems listening when spoken to directly: Yes (mind seems elsewhere, even in the absences of any obvious distraction)   Failure to finish tasks: Yes happens a lot at work and home, will start on task intended, then original task never completed, and dad will text her telling her once again you forgot to complete.  Avoids/Dislikes/Reluctant to engage in tasks that require sustained mental effort: Yes (schoolwork, homework,preparing " "reports,completing forms, reviewing lengthy papers) procrastinates   Easily distracted: Yes (unrelated thoughts, external factors) day dreams often  Forgetting things: Yes (daily chores, running errands, returning calls,, keeping appointments) able to remember to pay bills timely; went to work this morning and was not scheduled despite following routine to go to work while clocking in she received a message asking if she was still wanting to clock in as patient was not on the schedule; forgot to turn off car and in store for 1.5 hrs and thought she lost her keys-had several people looking and found the keys in car while vehicle still running-which had a regular key in the ignition.   Losing things: Yes (school materials, student ID-5-6 times, wallets, keys, cell phone, last year she got a new bank card 9 times, has lost drivers license multiple times)  Hard to organize: Yes difficulty managing sequential tasks, keeping materials and belongings in order, messy, disorganized work, poor time management, fails to meet deadlines at times sometimes missed assignments, if boss at work gave deadline is able to get it done but if gave self a deadline will not meet, keeps pushing it off, poor planning, prioritizing)   Talks a lot and cutting people off:Yes \"I talk so much, its all I really do, I used to cut people off, but when I was younger I learned it was rude, I actively try to make an effort to not cut people off.\" Voted most  talkative senior yr of highschool  Drifts off during conversations: Yes  Difficulty with Reading: Yes, has tried audio books though finds it boring, re-reads  Xiii. Difficulty watching TV/Movies: Yes, easily distracted and bored, needs to do another task to listen to a show or movie- will be coloring while watching otherwise does not follow what is going on and has to rewind, turn on captions.    Several inattentive or hyperactive-impulsive symptoms are present in 2 or more settings (home,work, " "school, with friends or relatives, in other activities).  Clear evidence the symptoms interfere with, or reduce the quality of, social, academic, or occupational functioning.        2/11/25:  INITIAL EVAL  Answers submitted by the patient for this visit:  Depression (Submitted on 2/9/2025)  Chief Complaint: Depression  Visit: initial  Onset: 1 to 5 years ago  Progression since onset: worse  Frequency: constantly  Severity: severe  excessive worry: Yes  insomnia: Yes  irritability: Yes  malaise/fatigue: Yes  obsessions: Yes  hypersomnia: Yes  difficulty controlling mood: Yes  difficulty staying asleep: Yes  difficulty falling asleep: Yes  Hours of sleep per night: 6 Hours  Aggravated by: nothing, family issues, social activities    Provider introduced self, as well as credentials, and informed of provider role which will primarily include medication management of mental health conditions. Explained the difference between provider role vs. therapy/counseling services which include CBT (talk therapy) and do not include management of medications which are typically 45 minutes to 1 hr in length, however, if patient was in agreement to start therapy this provider can provide a contact list and/or refer. Patient verbalized understanding and agreed to proceed.    Patient presents today in office with a history of anxiety and depression for which treatment began in the 6th grade.  Patient is currently prescribed Wellbutrin  mg every morning ( mg twice daily 2/2022-6/2023, then changed to  mg formulation) and Lexapro 20 mg every morning (9/17/24 10 mg, increased to 20 mg 11/6/24), which have provided effectiveness, though feels medications are not as effective as they were previously.  Patient denies significant PMHX.    Patient goal of treatment \"I want to get into a mindset where I wake up everyday, and don't think I am running out time, I want my mind to stop, peace and tranquility.\"    Patient has " "noticed physical symptoms of anxiety- shaky, sweaty, stutters, nausea, vomits. And will also find self getting up in the middle of the night due to not eating much during the day and binge eating. Denies purging behavior.    Anxiety: goes to work, to bed on time, getting up on time. Patient has tried to focus on a healthier diet, though it stresses her out, then ends up binging. Patient enjoys job at [employer], gets to work with boyfriend and best friend, though doesn't understand why she gets so anxious.  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  When patient is at boyfriend house will shut self in room and avoids going out to open areas by herself as his parents are in the home.  Patient has been with boyfriend for over 2 yrs and reports his family has treated her with respect, loving family whom have welcomed patient into their lives. They have went on vacations, family gatherings, and feels she should be comfortable eating around them, helping herself to fridge, etc while in their home, but isn't.    Symptoms started approximately her sophomore year of college, 2.5 yrs ago. Patient lived on campus at [facility] and was excited about going. 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  I think he really messed me up, he did a number on my self worth.\" He was from [facility], his name is Kit, and his beauty standards are \"tiny women\", a month after they stared dating would say, \"your gonna wear that or eat that, and stop eating a week before we go to his mom's.\" He was patient first official relationship. \"I gave him my first everything.\" Explains his mother would say similar things, and felt judged on how much she ate or what she wore.  [place] break up, as patient started realizing she was not happy. Best friend would question why patient would allow " "him and his mother to speak to her in such a manner. \"I was scared to break up with him.\"    Scared of being judged. Especially around food, always go back to food, boyfriend name is Gallo. Will hide bag of food, drinks or he will carry food. Will not eat in front of his parents, will take 2 bites and then feels sick at her stomach.    Bed weight of 194 lbs was at the ED and bed may have not been zeroed out prior to weight due to fluctuation value noted.   Wt Readings from Last 3 Encounters:   02/11/25 83.2 kg (183 lb 6.4 oz)   01/15/25 88.3 kg (194 lb 10.7 oz)   11/06/24 82.3 kg (181 lb 6.4 oz)     \"Physical feeling in brain not feeling fine for so long, something off.  Spending-I usually am a really anxious spender, but recently I want this and I will get this without 2nd thought, I am not saving money at all. Thoughts I feel I would never have, why am I here, too scared to kill self; feel life is too short; do you really have enough time to do that. Everyday is constantly going back and forth with self in head. Self doubt. Simple tasks-prioritizing, then hasn't done anything productive. Loses sense of time. Forget a lot more than used too. Eye doctor appointment I thought it was yesterday but its 21st.\" Symptoms present for almost 2 yrs.     If others don't use turn signal while driving, if stuff isn't done the way it is supposed to be done then this bad thing will happen. I've only felt this way for the last 2.5 yrs. I feel I am always weighed down, I can think of things Kit said and I am instantly sick and will shut down.\"     \"Lexapro has helped, but also feels it not like it used to and now I am back at square 1. Routine is the same. Not doing what it should be doing. Possible ADHD?\"    Psych: Eating Disorder The patient admits to binge eating episodes, described as occurring nearly everyday, eating excessive amount of calories at one time in secret .    PTSD:  Persistent and exaggerated negative beliefs or " "expectations about oneself, others, or the world (e.g., \"I am bad,\" \"No one can be trusted,\" \"The world is completely dangerous,\" \"My whole nervous system is permanently ruined\"), Persistent, distorted cognitions about the cause or consequences of the traumatic event(s) that lead the individual to blame himself/herself or others, Persistent negative emotional state (e.g., fear, horror, anger, guilt, or shame), Markedly diminished interest or participation in significant activities, and Persistent inability to experience positive emotions (e.g., inability to experience happiness, satisfaction, or loving feelings)  Intrusive thoughts of trauma, Triggers present, and Intense psychologic distress    Depression: Patient complains of depression. She complains of anhedonia, depressed mood, difficulty concentrating, fatigue, feelings of worthlessness/guilt, hopelessness, and insomnia     Anxiety:  The patient endorses to the following symptoms of anxiety including: excessive anxiety and worry about a number of events or activities for more days than not, restlessness or feeling keyed up, being easily fatigued, difficulty concentrating or mind going blank, irritability, and sleep disturbance which have caused impairment in important areas of daily functioning.      PHQ-9 Depression Screening  PHQ-9 Total Score:  2/9/2025 23   The PHQ has not been completed during this encounter.          BETI-7   2/9/2025 21    The following portions of the patient's history were reviewed and updated as appropriate: allergies, current medications, past family history, past medical history, past social history, and past surgical history.     Past Surgical History:  Past Surgical History:   Procedure Laterality Date    WISDOM TOOTH EXTRACTION Bilateral        Problem List:  Patient Active Problem List   Diagnosis    Anxiety and depression    Encounter for surveillance of vaginal ring hormonal contraceptive device    Screening for viral disease " "   Immunization due    Routine gynecological examination       Allergy:   No Known Allergies     Discontinued Medications:  Medications Discontinued During This Encounter   Medication Reason    buPROPion XL (WELLBUTRIN XL) 150 MG 24 hr tablet *Therapy completed           Current Medications:   Current Outpatient Medications   Medication Sig Dispense Refill    buPROPion XL (WELLBUTRIN XL) 300 MG 24 hr tablet Take 1 tablet by mouth Every Morning. TAKE WITH 150 MG TO EQUAL 450 MG TOTAL  Indications: Attention Deficit Hyperactivity Disorder, Major Depressive Disorder 90 tablet 0    dicyclomine (BENTYL) 20 MG tablet Take 1 tablet by mouth Every 6 (Six) Hours. 20 tablet 0    etonogestrel-ethinyl estradiol (EnilloRing) 0.12-0.015 MG/24HR vaginal ring Insert vaginally and leave in place for 3 consecutive weeks, then remove for 1 week. 9 each 3    ondansetron ODT (ZOFRAN-ODT) 4 MG disintegrating tablet Place 1 tablet on the tongue Every 8 (Eight) Hours As Needed for Nausea or Vomiting. 15 tablet 0    pantoprazole (PROTONIX) 40 MG EC tablet Take 1 tablet by mouth Daily. 90 tablet 1     No current facility-administered medications for this visit.       Past Medical History:  Past Medical History:   Diagnosis Date    Anxiety     Depression     Self-injurious behavior        Past Psychiatric History:  Began Treatment: 6th grade  Diagnoses:Depression and Anxiety  Psychiatrist:Frediies  Therapist: in [facility]  Admission History:Denies  Medication Trials: Wellbutrin  mg twice daily 2022-2023 due to keeping up with 2 times daily dosing,then switched to  mg; Lexapro up to 20 mg 2024-end of 3/2025 due to ineffectiveness, worsened anxiety which could have been related to Wellbutrin XL which was increased from 300 to 450 mg 2025    Self Harm:  \"6th grade one time, I was depressed and remember I watched a Moment.me video and she said it helped\"    Suicide Attempts:Denies   Psychosis, Anxiety, Depression:  " n/a    Substance Abuse History:   Types: smokes marijuana daily, vapes, flour, and edibles   AS OF 4/11/25 stopped using Marijuana 1 month ago and 4 days ago   Withdrawal Symptoms:Denies  Longest Period Sober:Not Applicable   AA: Not applicable   Legal: none    Social History:   Martial Status:Boyfriend   Employed:Yes and If so, where [employer] working 530am-230pm  Kids:No  House:Lives in a house with parents and brother & sister   History: Denies  Access to Guns:  Yes, used to be locked but cleaning currently-old shot gun, chace guns    Social History     Socioeconomic History    Marital status: Significant Other    Number of children: 0    Highest education level: Bachelor's degree (e.g., BA, AB, BS)   Tobacco Use    Smoking status: Never     Passive exposure: Past    Smokeless tobacco: Never   Vaping Use    Vaping status: Never Used   Substance and Sexual Activity    Alcohol use: Yes     Comment: socially    Drug use: Not Currently     Types: Marijuana     Comment: last Marijuana use 3/7/25 approx. updated 4/11/25.    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 Negative for chills, diaphoresis and fever.   HENT:  Negative for congestion and sore throat.    Respiratory:  Negative for cough and shortness of breath.    Cardiovascular:  Negative for chest pain and palpitations.   Gastrointestinal:  Negative for abdominal pain, nausea and vomiting.   Genitourinary:  Negative for dysuria.   Musculoskeletal:  Negative for myalgias and neck pain.   Skin:  Negative for rash.   Neurological:  Negative for dizziness, seizures, weakness, numbness and headaches.   Psychiatric/Behavioral:  Positive for decreased concentration and sleep disturbance. Negative for confusion, self-injury and suicidal ideas. The patient is nervous/anxious. The patient is not hyperactive.          Physical Exam:  Physical Exam  Psychiatric:         Attention and Perception: Attention and perception normal.         Mood and Affect: Mood is anxious and depressed.         Speech: Speech normal.         Behavior: Behavior normal. Behavior is cooperative.         Thought Content: Thought content normal. Thought content does not include suicidal ideation. Thought content does not include suicidal plan.         Cognition and Memory: Cognition and memory normal.         Judgment: Judgment normal.      Comments: Improving, mild         Vital Signs:   /70   Pulse 104   Ht 152.4 cm (60\")   Wt 82.7 kg (182 lb 6.4 oz)   BMI 35.62 kg/m²          Lab Results: REVIEWED  Office Visit on 05/19/2025   Component Date Value Ref Range Status    Amphetamine Screen, Urine 05/19/2025 Negative  Negative Final    Barbiturates Screen, Urine 05/19/2025 Negative  Negative Final    Buprenorphine, Screen, Urine 05/19/2025 Negative  " Negative Final    Benzodiazepine Screen, Urine 05/19/2025 Negative  Negative Final    Cocaine Screen, Urine 05/19/2025 Negative  Negative Final    MDMA (ECSTASY) 05/19/2025 Negative  Negative Final    Methamphetamine, Ur 05/19/2025 Negative  Negative Final    Morphine/Opiates Screen, Urine 05/19/2025 Negative  Negative Final    Methadone Screen, Urine 05/19/2025 Negative  Negative Final    Oxycodone Screen, Urine 05/19/2025 Negative  Negative Final    Phencyclidine (PCP), Urine 05/19/2025 Negative  Negative Final    THC, Screen, Urine 05/19/2025 Positive (A)  Negative Final    Lot Number 05/19/2025 [id number]   Final    Expiration Date 05/19/2025 1-16-27   Final    HCG, Urine, QL 05/19/2025 Negative  Negative Final    Lot Number 05/19/2025 [id number]   Final    Internal Positive Control 05/19/2025 Passed  Positive, Passed Final    Internal Negative Control 05/19/2025 Passed  Negative, Passed Final    Expiration Date 05/19/2025 7-24-26   Final   Lab on 04/29/2025   Component Date Value Ref Range Status    Total Cholesterol 04/29/2025 198  0 - 200 mg/dL Final    Triglycerides 04/29/2025 110  0 - 150 mg/dL Final    HDL Cholesterol 04/29/2025 50  40 - 60 mg/dL Final    LDL Cholesterol  04/29/2025 128 (H)  0 - 100 mg/dL Final    VLDL Cholesterol 04/29/2025 20  5 - 40 mg/dL Final    LDL/HDL Ratio 04/29/2025 2.52   Final    Glucose 04/29/2025 82  65 - 99 mg/dL Final    BUN 04/29/2025 8  6 - 20 mg/dL Final    Creatinine 04/29/2025 0.55 (L)  0.57 - 1.00 mg/dL Final    Sodium 04/29/2025 139  136 - 145 mmol/L Final    Potassium 04/29/2025 3.9  3.5 - 5.2 mmol/L Final    Chloride 04/29/2025 107  98 - 107 mmol/L Final    CO2 04/29/2025 20.8 (L)  22.0 - 29.0 mmol/L Final    Calcium 04/29/2025 8.8  8.6 - 10.5 mg/dL Final    Total Protein 04/29/2025 7.1  6.0 - 8.5 g/dL Final    Albumin 04/29/2025 4.0  3.5 - 5.2 g/dL Final    ALT (SGPT) 04/29/2025 23  1 - 33 U/L Final    AST (SGOT) 04/29/2025 24  1 - 32 U/L Final    Alkaline  Phosphatase 04/29/2025 48  39 - 117 U/L Final    Total Bilirubin 04/29/2025 0.2  0.0 - 1.2 mg/dL Final    Globulin 04/29/2025 3.1  gm/dL Final    A/G Ratio 04/29/2025 1.3  g/dL Final    BUN/Creatinine Ratio 04/29/2025 14.5  7.0 - 25.0 Final    Anion Gap 04/29/2025 11.2  5.0 - 15.0 mmol/L Final    eGFR 04/29/2025 133.9  >60.0 mL/min/1.73 Final    TSH 04/29/2025 0.887  0.270 - 4.200 uIU/mL Final    WBC 04/29/2025 7.48  3.40 - 10.80 10*3/mm3 Final    RBC 04/29/2025 4.53  3.77 - 5.28 10*6/mm3 Final    Hemoglobin 04/29/2025 12.6  12.0 - 15.9 g/dL Final    Hematocrit 04/29/2025 38.5  34.0 - 46.6 % Final    MCV 04/29/2025 85.0  79.0 - 97.0 fL Final    MCH 04/29/2025 27.8  26.6 - 33.0 pg Final    MCHC 04/29/2025 32.7  31.5 - 35.7 g/dL Final    RDW 04/29/2025 12.5  12.3 - 15.4 % Final    RDW-SD 04/29/2025 39.8  37.0 - 54.0 fl Final    MPV 04/29/2025 10.3  6.0 - 12.0 fL Final    Platelets 04/29/2025 241  140 - 450 10*3/mm3 Final    Neutrophil % 04/29/2025 50.5  42.7 - 76.0 % Final    Lymphocyte % 04/29/2025 43.6  19.6 - 45.3 % Final    Monocyte % 04/29/2025 4.5 (L)  5.0 - 12.0 % Final    Eosinophil % 04/29/2025 0.9  0.3 - 6.2 % Final    Basophil % 04/29/2025 0.4  0.0 - 1.5 % Final    Immature Grans % 04/29/2025 0.1  0.0 - 0.5 % Final    Neutrophils, Absolute 04/29/2025 3.77  1.70 - 7.00 10*3/mm3 Final    Lymphocytes, Absolute 04/29/2025 3.26 (H)  0.70 - 3.10 10*3/mm3 Final    Monocytes, Absolute 04/29/2025 0.34  0.10 - 0.90 10*3/mm3 Final    Eosinophils, Absolute 04/29/2025 0.07  0.00 - 0.40 10*3/mm3 Final    Basophils, Absolute 04/29/2025 0.03  0.00 - 0.20 10*3/mm3 Final    Immature Grans, Absolute 04/29/2025 0.01  0.00 - 0.05 10*3/mm3 Final   Results Follow-Up on 04/24/2025   Component Date Value Ref Range Status    THC, Urine 04/24/2025 Positive (A)   Final    Carboxy THC, Urine 04/24/2025 11  Cutoff=10 ng/mL Final    Please note 04/24/2025 Comment   Final    Drug test results should be interpreted in the context of  clinical  information. Patient metabolic variables, specific drug chemistry, and  specimen characteristics can affect test outcome. Technical  consultation is available if a test result is inconsistent with an  expected outcome.    Email:  [email or web address]    Phone:  [phone]   Office Visit on 04/24/2025   Component Date Value Ref Range Status    Amphetamine Screen, Urine 04/24/2025 Negative  Negative Final    Barbiturates Screen, Urine 04/24/2025 Negative  Negative Final    Buprenorphine, Screen, Urine 04/24/2025 Negative  Negative Final    Benzodiazepine Screen, Urine 04/24/2025 Negative  Negative Final    Cocaine Screen, Urine 04/24/2025 Negative  Negative Final    MDMA (ECSTASY) 04/24/2025 Negative  Negative Final    Methamphetamine, Ur 04/24/2025 Negative  Negative Final    Morphine/Opiates Screen, Urine 04/24/2025 Negative  Negative Final    Methadone Screen, Urine 04/24/2025 Negative  Negative Final    Oxycodone Screen, Urine 04/24/2025 Negative  Negative Final    Phencyclidine (PCP), Urine 04/24/2025 Negative  Negative Final    THC, Screen, Urine 04/24/2025 Positive (A)  Negative Final    Lot Number 04/24/2025 [id number]   Final    Expiration Date 04/24/2025 11-7-26   Final    HCG, Urine, QL 04/24/2025 Negative  Negative Final    Lot Number 04/24/2025 #[id number]   Final    Internal Positive Control 04/24/2025 Passed  Positive, Passed Final    Internal Negative Control 04/24/2025 Passed  Negative, Passed Final    Expiration Date 04/24/2025 5-26-26   Final   Office Visit on 04/07/2025   Component Date Value Ref Range Status    Color 04/07/2025 Yellow  Yellow, Straw, Dark Yellow, Sharita Final    Clarity, UA 04/07/2025 Clear  Clear Final    Specific Gravity  04/07/2025 1.020  1.005 - 1.030 Final    pH, Urine 04/07/2025 8.0  5.0 - 8.0 Final    Leukocytes 04/07/2025 Negative  Negative Final    Nitrite, UA 04/07/2025 Negative  Negative Final    Protein, POC 04/07/2025 Negative  Negative mg/dL  Final    Glucose, UA 04/07/2025 Negative  Negative mg/dL Final    Ketones, UA 04/07/2025 Negative  Negative Final    Urobilinogen, UA 04/07/2025 0.2 E.U./dL  Normal, 0.2 E.U./dL Final    Bilirubin 04/07/2025 Negative  Negative Final    Blood, UA 04/07/2025 Trace (A)  Negative Final    Lot Number 04/07/2025 [id number]   Final    Expiration Date 04/07/2025 10/31/25   Final    Diagnosis 04/07/2025 Comment   Final    NEGATIVE FOR INTRAEPITHELIAL LESION OR MALIGNANCY.    Specimen adequacy: 04/07/2025 Comment   Final    Satisfactory for evaluation.  Endocervical and/or squamous metaplastic  cells (endocervical component) are present.    Clinician Provided ICD-10: 04/07/2025 Comment   Final    Z01.419    Performed by: 04/07/2025 Comment   Corrected    Timo Niño, Cytologist ([facility])    . 04/07/2025 .   Final    Note: 04/07/2025 Comment   Final    The Pap smear is a screening test designed to aid in the detection of  premalignant and malignant conditions of the uterine cervix.  It is not a  diagnostic procedure and should not be used as the sole means of detecting  cervical cancer.  Both false-positive and false-negative reports do occur.   Admission on 01/15/2025, Discharged on 01/15/2025   Component Date Value Ref Range Status    Glucose 01/15/2025 105 (H)  65 - 99 mg/dL Final    BUN 01/15/2025 6  6 - 20 mg/dL Final    Creatinine 01/15/2025 0.60  0.57 - 1.00 mg/dL Final    Sodium 01/15/2025 137  136 - 145 mmol/L Final    Potassium 01/15/2025 3.6  3.5 - 5.2 mmol/L Final    Chloride 01/15/2025 105  98 - 107 mmol/L Final    CO2 01/15/2025 18.8 (L)  22.0 - 29.0 mmol/L Final    Calcium 01/15/2025 8.8  8.6 - 10.5 mg/dL Final    Total Protein 01/15/2025 7.6  6.0 - 8.5 g/dL Final    Albumin 01/15/2025 4.1  3.5 - 5.2 g/dL Final    ALT (SGPT) 01/15/2025 22  1 - 33 U/L Final    AST (SGOT) 01/15/2025 22  1 - 32 U/L Final    Alkaline Phosphatase 01/15/2025 58  39 - 117 U/L Final    Total Bilirubin 01/15/2025 0.4  0.0 - 1.2 mg/dL Final     Globulin 01/15/2025 3.5  gm/dL Final    A/G Ratio 01/15/2025 1.2  g/dL Final    BUN/Creatinine Ratio 01/15/2025 10.0  7.0 - 25.0 Final    Anion Gap 01/15/2025 13.2  5.0 - 15.0 mmol/L Final    eGFR 01/15/2025 131.2  >60.0 mL/min/1.73 Final    Lipase 01/15/2025 29  13 - 60 U/L Final    Color, UA 01/15/2025 Yellow  Yellow, Straw Final    Appearance, UA 01/15/2025 Clear  Clear Final    pH, UA 01/15/2025 5.5  5.0 - 8.0 Final    Specific Gravity, UA 01/15/2025 1.019  1.005 - 1.030 Final    Glucose, UA 01/15/2025 Negative  Negative Final    Ketones, UA 01/15/2025 Negative  Negative Final    Bilirubin, UA 01/15/2025 Negative  Negative Final    Blood, UA 01/15/2025 Small (1+) (A)  Negative Final    Protein, UA 01/15/2025 Negative  Negative Final    Leuk Esterase, UA 01/15/2025 Negative  Negative Final    Nitrite, UA 01/15/2025 Negative  Negative Final    Urobilinogen, UA 01/15/2025 0.2 E.U./dL  0.2 - 1.0 E.U./dL Final    HCG Quantitative 01/15/2025 <0.50  mIU/mL Final    Extra Tube 01/15/2025 Hold for add-ons.   Final    Auto resulted.    Extra Tube 01/15/2025 hold for add-on   Final    Auto resulted    Extra Tube 01/15/2025 Hold for add-ons.   Final    Auto resulted.    Extra Tube 01/15/2025 Hold for add-ons.   Final    Auto resulted    WBC 01/15/2025 12.77 (H)  3.40 - 10.80 10*3/mm3 Final    RBC 01/15/2025 4.91  3.77 - 5.28 10*6/mm3 Final    Hemoglobin 01/15/2025 13.1  12.0 - 15.9 g/dL Final    Hematocrit 01/15/2025 40.9  34.0 - 46.6 % Final    MCV 01/15/2025 83.3  79.0 - 97.0 fL Final    MCH 01/15/2025 26.7  26.6 - 33.0 pg Final    MCHC 01/15/2025 32.0  31.5 - 35.7 g/dL Final    RDW 01/15/2025 13.6  12.3 - 15.4 % Final    RDW-SD 01/15/2025 41.2  37.0 - 54.0 fl Final    MPV 01/15/2025 10.5  6.0 - 12.0 fL Final    Platelets 01/15/2025 276  140 - 450 10*3/mm3 Final    Neutrophil % 01/15/2025 82.5 (H)  42.7 - 76.0 % Final    Lymphocyte % 01/15/2025 12.7 (L)  19.6 - 45.3 % Final    Monocyte % 01/15/2025 3.7 (L)  5.0 - 12.0  % Final    Eosinophil % 01/15/2025 0.3  0.3 - 6.2 % Final    Basophil % 01/15/2025 0.3  0.0 - 1.5 % Final    Immature Grans % 01/15/2025 0.5  0.0 - 0.5 % Final    Neutrophils, Absolute 01/15/2025 10.54 (H)  1.70 - 7.00 10*3/mm3 Final    Lymphocytes, Absolute 01/15/2025 1.62  0.70 - 3.10 10*3/mm3 Final    Monocytes, Absolute 01/15/2025 0.47  0.10 - 0.90 10*3/mm3 Final    Eosinophils, Absolute 01/15/2025 0.04  0.00 - 0.40 10*3/mm3 Final    Basophils, Absolute 01/15/2025 0.04  0.00 - 0.20 10*3/mm3 Final    Immature Grans, Absolute 01/15/2025 0.06 (H)  0.00 - 0.05 10*3/mm3 Final    nRBC 01/15/2025 0.0  0.0 - 0.2 /100 WBC Final    RBC, UA 01/15/2025 None Seen  None Seen, 0-2 /HPF Final    WBC, UA 01/15/2025 None Seen  None Seen, 0-2 /HPF Final    Bacteria, UA 01/15/2025 None Seen  None Seen /HPF Final    Squamous Epithelial Cells, UA 01/15/2025 3-6 (A)  None Seen, 0-2 /HPF Final    Hyaline Casts, UA 01/15/2025 None Seen  None Seen /LPF Final    Methodology 01/15/2025 Automated Microscopy   Final       EKG Results:  No orders to display       Imaging Results:  CT Abdomen Pelvis With Contrast    Result Date: 1/15/2025  Impression: 1.No acute intra-abdominal or intrapelvic abnormality identified. Electronically Signed: Abhi Jauregui MD  1/15/2025 12:51 PM EST  Workstation ID: [id number]        Assessment & Plan   Diagnoses and all orders for this visit:    1. Attention deficit hyperactivity disorder, combined type (Primary)    2. Generalized anxiety disorder    3. Major depressive disorder, recurrent episode, moderate    4. High risk medication use  -     POC Medline 12 Panel Urine Drug Screen  -     POC Pregnancy, Urine    5. History of marijuana use  -     POC Medline 12 Panel Urine Drug Screen  -     POC Pregnancy, Urine  -     THC (marijuana), Urine, Confirmation - Urine, Clean Catch; Future    6. Encounter before starting medication  -     POC Medline 12 Panel Urine Drug Screen  -     POC Pregnancy, Urine  -     THC  (marijuana), Urine, Confirmation - Urine, Clean Catch; Future    7. Positive urine drug screen  -     THC (marijuana), Urine, Confirmation - Urine, Clean Catch; Future    8. Medication management    9. Medication care plan discussed with patient    10. At risk for medication nonadherence    11. Controlled substance agreement signed              Visit Diagnoses:    ICD-10-CM ICD-9-CM   1. Attention deficit hyperactivity disorder, combined type  F90.2 314.01   2. Generalized anxiety disorder  F41.1 300.02   3. Major depressive disorder, recurrent episode, moderate  F33.1 296.32   4. High risk medication use  Z79.899 V58.69   5. History of marijuana use  F12.91 305.23   6. Encounter before starting medication  Z76.89 V70.9   7. Positive urine drug screen  R82.5 796.0   8. Medication management  Z79.899 V58.69   9. Medication care plan discussed with patient  Z71.89 V65.49   10. At risk for medication nonadherence  Z91.89 V49.89   11. Controlled substance agreement signed  Z79.899 V58.69             PLAN:  Safety: No acute safety concerns  Therapy: Currently Seeing a Therapist [facility] with Juana Templeton LCSW  Risk Assessment: Risk of self-harm acutely is moderate.  Risk factors include anxiety disorder and mood disorder, access to guns/weapons, daily marijuana use.  Protective factors include no family history, no present SI, no history of suicide attempts or self-harm in the past, healthcare seeking, future orientation, willingness to engage in care.  Risk of self-harm chronically is also moderate, but could be further elevated in the event of treatment noncompliance and/or AODA.  Meds:  -Continue Wellbutrin  mg by mouth daily in the morning to target depression, anxiety, attention and concentration. Discussed all risks, benefits, alternatives, and side effects of Bupropion including but not limited to GI upset (N/V/D, constipation), tachycardia, diaphoresis, weight loss, decreased appetite,  agitation, dizziness, headache, insomnia, tremor, blurred vision, anorexia, increased blood pressure and/or heart rate, activation of anupam or hypomania, CNS stimulation and neuropsychiatric effects, ocular effects, seizure risk, withdrawal syndrome following abrupt discontinuation, and activation of suicidal ideation and behavior. Discussed the need for patient to immediately call the office for any new or worsening symptoms, such as worsening depression; feeling nervous or restless; suicidal thoughts or actions; or other changes changes in mood or behavior, and all other concerns. Patient educated on medication compliance. Patient verbalized understanding and is agreeable to taking Bupropion. Addressed all questions and concerns. Informed patient the dose may need to be lowered pending tolerance of combination with Adderall XR. Patient did run out of medications previously for approximately 1.5 weeks, however, due to now having a 30 day supply of both the 150 mg and the 300 mg, patient will not need a refill until after next scheduled appointment in 5 weeks, as patient has been instructed to take 2 of the 150 mg once the 300 mg dose is depleted.     -PLAN to Start Adderall XR 10 mg by mouth daily in the morning to target symptoms associated with ADHD. Risks, benefits, side effects discussed with patient including elevated heart rate, elevated blood pressure, irritability, insomnia, sexual dysfunction, appetite suppressing properties, psychosis.  After discussion of these risks and benefits, the patient voiced understanding and agreed to proceed. [place] reviewed, UDS ordered, and controlled substance agreement signed & witnessed.   Informed patient order would not be sent until confirmation of THC is resulted which can take up to 7 days, though last confirmation resulted in 5 days. Informed patient order would be sent to either Dr. Flores if over the weekend and holiday or Dr. Phipps if results are not available  until Tues. 5/27/25, due to this provider being out of office all next week 5/27-5/30/25, in separate entry for signature due to EMR system once results received and are negative.  Patient is aware of risk of addiction on this medication, understands need to follow up for a review every 3 months and medications will be adjusted or decreased as deemed appropriate at each visit. No concerns about diversion or abuse. Patient denies side effects related to the medication. Patient is aware of random urine drug screens and pill counts. The dosing of this medication will be reviewed on a regular basis.    Labs: POC UDS and Pregnancy today in clinic, results noted as positive THC, which was not expected due to reporting last THC use was 3/9/25 and denies passive exposure. Will contact patient in separate encounter to inform of confirmation results. THC confirmation ordered to further determine levels. As last level was 11.     Symptoms of anxiety, depression are under good control with current medication regimen.  ADHD symptoms remain active.  A new CSA was signed today due to likelihood of starting Adderall in the next week.   The plan was discussed with the patient. The patient was given time to ask questions and these questions were answered. At the conclusion of their visit they had no additional questions or concerns and all questions were answered to their satisfaction.   Patient was given instructions and counseling regarding condition and for health maintenance advice. Please see specific information pulled into the AVS if appropriate.    Patient to contact provider if symptoms worsen or fail to improve.        4/29/25: TELEPHONE  -Confirmation level of THC 11, please contact patient to inform of results and will plan on repeating at next scheduled visit 5/19/25, will not start Adderall until a negative UDS is obtained.   -Called patient Left detailed message on patient's voicemail    4/24/25: TELEPHONE  Called  "patient to inform of positive UDS for THC and a confirmation level has been ordered, which can take up to 7 days to result, informed patient once received she will be contacted, until results are confirmed as negative, the Adderall XR will not be ordered. Patient verbalized understanding, informed patient due to daily THC use it can take over 30 days to no longer be detected on a UDS. \"I was so upset when I saw that.\" Assured patient her credibility was not reflected, and sometimes there are false positives, and would call patient once received confirmation.     4/24/25:   -Continue Wellbutrin  mg by mouth daily in the morning to target depression, anxiety, attention and concentration. Informed patient the dose may need to be lowered pending tolerance of combination with Adderall XR.    -PLAN to Start Adderall XR 10 mg by mouth daily in the morning to target symptoms associated with ADHD. Risks, benefits, side effects discussed with patient including elevated heart rate, elevated blood pressure, irritability, insomnia, sexual dysfunction, appetite suppressing properties, psychosis.  After discussion of these risks and benefits, the patient voiced understanding and agreed to proceed. [place] reviewed, UDS ordered, and controlled substance agreement signed & witnessed. Informed patient order would not be sent until negative UDS & Pregnancy results received.  Informed patient order would be sent to Dr. Phipps in separate entry for signature due to EMR system once results received and are negative.  Patient is aware of risk of addiction on this medication, understands need to follow up for a review every 3 months and medications will be adjusted or decreased as deemed appropriate at each visit.  No history of drug or alcohol abuse.  No concerns about diversion or abuse. Patient denies side effects related to the medication.  Patient is aware of random urine drug screens and pill counts. The dosing of this medication " will be reviewed on a regular basis.      -Labs: POC UDS and Pregnancy today in clinic, results noted as positive THC, which was not expected due to reporting last THC use was approximately 6.5 to 7 weeks ago, though due to daily use it may take longer than current duration of time to not be detected on UDS. Will contact patient in separate encounter to inform. THC confirmation ordered to further determine levels.    Symptoms of anxiety, depression, ADHD are under fair control with current medication regimen.    The plan was discussed with the patient. The patient was given time to ask questions and these questions were answered. At the conclusion of their visit they had no additional questions or concerns and all questions were answered to their satisfaction.   Patient was given instructions and counseling regarding condition and for health maintenance advice. Please see specific information pulled into the AVS if appropriate.    Patient to contact provider if symptoms worsen or fail to improve.        4/11/25:  -DECREASE Wellbutrin XL FROM 450 mg back  mg by mouth daily in the morning to target depression, anxiety, attention and concentration. Removed 150 mg tablet from medication profile.     -Removed Escitalopram (LEXAPRO) 20 mg from profile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

## 2025-05-19 NOTE — PATIENT INSTRUCTIONS
"Should you want to get in touch with your provider, KAYKAY Ponce, please contact MY Medical Assistant, Alondra, directly at [phone].  Recommend saving Alondra's direct number in phone as this is the PREFERRED & EASIEST way to get in contact with your provider.  Please leave a voice mail if you do not get an answer and she will return your call within 24 hrs. You will NOT be able to contact provider on [employer], as Behavioral Health Providers are restricted. YOU MUST CALL [phone]  If you need to speak with the on call provider after hours or on weekends, please Contact the [facility] ([phone]) and staff will be able to page the provider on call directly.     SPECIFIC RECOMMENDATIONS:     1.      Medications discussed at this encounter:                   -  Will contact you once confirmation results received which will likely be this weekend, or the earliest Friday 5/23/25.    2.      Psychotherapy recommendations: Continue with current therapist.     3.     Return to clinic: 5 weeks Monday 6/23/25 at 11 am    Please arrive at least 15 minutes before your scheduled appointment time to complete check in process.      IF you are scheduled for a [employer] VIDEO visit, YOU MUST COMPLETE THE \"E-CHECK IN\" PROCESS PRIOR TO BEGINNING THE VISIT, You may still complete the E-Check in for in office visits prior to appointment, you will receive multiple text/email reminders which will direct you further if needed.            "

## 2025-05-22 NOTE — PROGRESS NOTES
"Progress Note    Date: 2025  Time In: 8:02  Time Out: 9:00    Patient Legal Name: Brenda Garcia  Patient Age: 19 y.o.    Mode of visit: In person  Location of provider: [facility] [address], [place] [address]  Location of patient: Office      CHIEF COMPLAINT: anxiety, depression, binge-eating     Subjective   History of Present Illness   Brenda \"Stephanie\" is a 19 y.o. female who presents today as a follow-up for continued psychotherapy. Patient running a couple of minutes late today. Patient reported doing \"pretty good.\"  Patient stated she gets depressed at nights as she dreads going to work.  Patient shared shared she has not had any luck so far looking for a new job bu tis reminding herself to be patient. Patient advised she is excited to get to start her Adderall today.  Patient shared about some conflicts in the home.  Patient shared that she is concerned about having her sister move in with her and her boyfriend because she is not very responsible. Patient identified having difficulty setting boundaries with family because she does not want to upset anyone. Patient described feeling excited about moving out and becoming more independent.  Patient is voluntarily requesting to participate in outpatient therapy at [facility].      History obtained from referring provider's note on 25:  Past Psychiatric History:  Began Treatment: 6th grade  Diagnoses:Depression and Anxiety  Psychiatrist:Denies  [facility]  Admission History:Denies  Medication Trials: Wellbutrin  mg twice daily 2022-2023 due to keeping up with 2 times daily dosing,then switched to  mg    Self Harm:  \"6th grade one time, I was depressed and remember I watched a [employer] video and she said it helped\"    Suicide Attempts:Denies   Psychosis, Anxiety, Depression:  n/a     Patient actively participates in sessions and seems open to therapeutic interventions " introduced. Patient reports utilizing coping strategies presented in sessions and appears to be calmer and less stressed recently.     Assessment    Mental Status Exam     Appearance: good hygiene and dressed appropriately for the weather  Behavior: calm  Cooperation:  engaged, cooperative, attentive, and friendly  Eye Contact:  good  Affect:  bright  Mood: expressive  Speech: talkative  Thought Process:  organized  Thought Content: appropriate  Suicidal: denies  Homicidal:  denies  Hallucinations:  denies  Memory:  intact  Orientation:  person, place, time, and situation  Reliability:  reliable  Insight:  good  Judgment:  good    Clinical Intervention       ICD-10-CM ICD-9-CM   1. Attention deficit hyperactivity disorder, combined type  F90.2 314.01   2. Generalized anxiety disorder  F41.1 300.02   3. Major depressive disorder, recurrent episode, moderate  F33.1 296.32   4. Binge eating disorder, moderate  F50.811 307.59        Individual psychotherapy was provided utilizing CBT and person-centered techniques to provide symptom relief, build rapport, encourage expression of thoughts and feelings, support self-esteem, establish new coping skills, discuss values and strengths, recognize patterns of behavior, identify strengths, build confidence, assess symptoms, and provide psychoeducation.  Therapist utilized open-ended questions to encourage the development of a positive therapeutic relationship and open communication.  Therapist normalized/validated patient’s thoughts and feelings as appropriate. Discussed the benefits of establishing boundaries within her family. Processed thoughts and feelings about her sister's bx and the pros and cons of letting her move in with her and her boyfriend. Explained phases of life and adjusting to the changes presented.     Plan   Plan & Goals     Moving forward, we will continue to build rapport and reinforce and build upon effective coping strategies utilizing CBT and  person-centered techniques.    Patient acknowledged and verbally consented to continue working toward resolving current treatment plan goals and was educated on the importance of participation in the therapeutic process.  Patient will remain compliant with medication regimen as prescribed. Discuss any medication side effects, questions or concerns with prescribing provider.  Call 911 or present to the nearest emergency room in an emergency situation.  National Suicide Prevention Lifeline: Call 988. The Lifeline provides 24/7, free and confidential support for people in distress, prevention and crisis resources.  Crisis Text Line  Text HOME To 466213    Return in about 2 weeks (around 6/10/2025).    ____________________  This document has been electronically signed by Juana Templeton LCSW  May 27, 2025 09:12 EDT    Part of this note may be an electronic transcription/translation of spoken language to printed text using the Dragon Dictation System.

## 2025-05-23 DIAGNOSIS — F90.2 ATTENTION DEFICIT HYPERACTIVITY DISORDER, COMBINED TYPE: Primary | ICD-10-CM

## 2025-05-23 LAB
CANNABINOIDS UR QL CFM: NEGATIVE
LABORATORY COMMENT REPORT: NORMAL

## 2025-05-23 RX ORDER — DEXTROAMPHETAMINE SACCHARATE, AMPHETAMINE ASPARTATE MONOHYDRATE, DEXTROAMPHETAMINE SULFATE AND AMPHETAMINE SULFATE 2.5; 2.5; 2.5; 2.5 MG/1; MG/1; MG/1; MG/1
10 CAPSULE, EXTENDED RELEASE ORAL EVERY MORNING
Qty: 30 CAPSULE | Refills: 0 | Status: SHIPPED | OUTPATIENT
Start: 2025-05-24

## 2025-05-23 NOTE — TELEPHONE ENCOUNTER
Called patient and left voicemail informing confirmation of THC results received as negative, and the order for Adderall XR 10 mg will be sent to  for signature to [facility] in [place], and if there were any further questions to contact office at [phone] today, and if after 430pm today and for next week to contact the [place] office if needed at [phone].

## 2025-05-27 ENCOUNTER — OFFICE VISIT (OUTPATIENT)
Age: 20
End: 2025-05-27
Payer: COMMERCIAL

## 2025-05-27 DIAGNOSIS — F90.2 ATTENTION DEFICIT HYPERACTIVITY DISORDER, COMBINED TYPE: Primary | ICD-10-CM

## 2025-05-27 DIAGNOSIS — F50.811 BINGE EATING DISORDER, MODERATE: ICD-10-CM

## 2025-05-27 DIAGNOSIS — F33.1 MAJOR DEPRESSIVE DISORDER, RECURRENT EPISODE, MODERATE: ICD-10-CM

## 2025-05-27 DIAGNOSIS — F41.1 GENERALIZED ANXIETY DISORDER: ICD-10-CM

## 2025-06-06 NOTE — PROGRESS NOTES
"Progress Note    Date: 2025  Time In: 8:03  Time Out: 8:58    Patient Legal Name: Brenda Garcia  Patient Age: 19 y.o.    Mode of visit: In person  Location of provider: [id number], [place] [address]  Location of patient: Office      CHIEF COMPLAINT: anxiety, depression, binge-eating     Subjective   History of Present Illness   Brenda \"Stephanie\" is a 19 y.o. female who presents today as a follow-up for continued psychotherapy. Patient reported she has told her sister that she cannot move in with her and it went well.  Patient stated she has started the Adderall and noted she has felt more depressed and has thought more about her mortality.  Patient shared it has also been harder for her to go to sleep with the Adderall. Patient advised she is excited about vacationing with her boyfriend and family.  Patient reported she has been applying to more jobs but has not had any responses.  Patient shared she has been having more positive moments with her father and not getting upset by his bx. Patient described starting to enjoy work more by changing he \"mindset\" about it. Patient expressed being very excited about an upcoming [employer] tour Patient explained she is very interested in the paranormal. Patient is voluntarily requesting to participate in outpatient therapy at [facility].      History obtained from referring provider's note on 25:  Past Psychiatric History:  Began Treatment: 6th grade  Diagnoses:Depression and Anxiety  Psychiatrist:Denies  Therapist: in highschool  Admission History:Denies  Medication Trials: Wellbutrin  mg twice daily 2022-2023 due to keeping up with 2 times daily dosing,then switched to  mg    Self Harm:  \"6th grade one time, I was depressed and remember I watched a [employer] video and she said it helped\"    Suicide Attempts:Denies   Psychosis, Anxiety, Depression:  n/a     Patient actively participates " in sessions and seems open to therapeutic interventions introduced. Patient reports utilizing coping strategies presented in sessions and appears to be calmer and less stressed recently.     Assessment    Mental Status Exam     Appearance: good hygiene and dressed appropriately for the weather  Behavior: calm  Cooperation:  engaged, cooperative, attentive, and friendly  Eye Contact:  good  Affect:  congruent  Mood: expressive  Speech: talkative  Thought Process:  organized  Thought Content: appropriate  Suicidal: denies  Homicidal:  denies  Hallucinations:  denies  Memory:  intact  Orientation:  person, place, time, and situation  Reliability:  reliable  Insight:  good  Judgment:  good    Clinical Intervention       ICD-10-CM ICD-9-CM   1. Attention deficit hyperactivity disorder, combined type  F90.2 314.01   2. Generalized anxiety disorder  F41.1 300.02   3. Major depressive disorder, recurrent episode, moderate  F33.1 296.32   4. Binge eating disorder, moderate  F50.811 307.59        Individual psychotherapy was provided utilizing CBT and person-centered techniques to build rapport, encourage expression of thoughts and feelings, support self-esteem, establish new coping skills, develop healthy communication skills, build confidence, assess symptoms, establish therapeutic alliance, define and establish appropriate boundaries, and provide psychoeducation.  Therapist utilized open-ended questions to encourage the development of a positive therapeutic relationship and open communication.  Therapist normalized/validated patient’s thoughts and feelings as appropriate. Discussed benefits of setting healthy boundaries and provided handouts regarding setting boundaries and ways to say no. Recommended patient keep note of changes since starting Adderall and discuss concerns with Lacy.    Plan   Plan & Goals     Moving forward, we will continue to build rapport and reinforce and build upon effective coping strategies  utilizing CBT and person-centered techniques.    Patient acknowledged and verbally consented to continue working toward resolving current treatment plan goals and was educated on the importance of participation in the therapeutic process.  Patient will remain compliant with medication regimen as prescribed. Discuss any medication side effects, questions or concerns with prescribing provider.  Call 911 or present to the nearest emergency room in an emergency situation.  National Suicide Prevention Lifeline: Call 988. The Lifeline provides 24/7, free and confidential support for people in distress, prevention and crisis resources.  Crisis Text Line  Text HOME To 650411    Return in about 2 weeks (around 6/23/2025).    ____________________  This document has been electronically signed by Juana Templeton LCSW  June 9, 2025 09:10 EDT    Part of this note may be an electronic transcription/translation of spoken language to printed text using the Dragon Dictation System.

## 2025-06-09 ENCOUNTER — OFFICE VISIT (OUTPATIENT)
Age: 20
End: 2025-06-09
Payer: COMMERCIAL

## 2025-06-09 DIAGNOSIS — F50.811 BINGE EATING DISORDER, MODERATE: ICD-10-CM

## 2025-06-09 DIAGNOSIS — F33.1 MAJOR DEPRESSIVE DISORDER, RECURRENT EPISODE, MODERATE: ICD-10-CM

## 2025-06-09 DIAGNOSIS — F41.1 GENERALIZED ANXIETY DISORDER: ICD-10-CM

## 2025-06-09 DIAGNOSIS — F90.2 ATTENTION DEFICIT HYPERACTIVITY DISORDER, COMBINED TYPE: Primary | ICD-10-CM

## 2025-06-14 DIAGNOSIS — F41.1 GENERALIZED ANXIETY DISORDER: ICD-10-CM

## 2025-06-14 DIAGNOSIS — F33.1 MAJOR DEPRESSIVE DISORDER, RECURRENT EPISODE, MODERATE: ICD-10-CM

## 2025-06-14 DIAGNOSIS — F90.2 ATTENTION DEFICIT HYPERACTIVITY DISORDER, COMBINED TYPE: ICD-10-CM

## 2025-06-16 RX ORDER — BUPROPION HYDROCHLORIDE 150 MG/1
TABLET ORAL
Qty: 30 TABLET | Refills: 1 | OUTPATIENT
Start: 2025-06-16

## 2025-06-16 NOTE — TELEPHONE ENCOUNTER
REFILL REQUEST FROM THE PHARMACY.    PT IS NO LONGER ON THIS DOSAGE OF THIS MEDICATION. DISCONTINUED ON 04/11/2025.    buPROPion XL (Wellbutrin XL) 150 MG 24 hr tablet (02/28/2025)

## 2025-06-19 NOTE — PROGRESS NOTES
"Progress Note    Date: 2025  Time In: 10:00  Time Out: 10:56    Patient Legal Name: Brenda Garcia  Patient Age: 19 y.o.    Mode of visit: In person  Location of provider: [id number], [place] [address]  Location of patient: Office      CHIEF COMPLAINT:  anxiety, depression, adhd,  binge-eating     Subjective   History of Present Illness   Brenda \"Stephanie\" is a 19 y.o. female who presents today as a follow-up for continued psychotherapy. Patient reported she has been \"very anxious and it has been hard to sleep.\"  Patient stated it has been really bad since last visit, difficulty focusing, \"bad\" feeling in her stomach, and continuous anxious thoughts.  Patient shared she thinks the anxiety may be related to taking the Adderall. Patient advised \"nothing is helping\" with the anxiety. Patient reported frustration with recent changes in mood.  Patient shared minor conflict with her father regarding her mom's birthday but  advised she navigated the situation effectively. Patient described being proud of herself for going to an outing that she was feeling anxious about.  Patient is voluntarily requesting to participate in outpatient therapy at [facility].  Patient rated anxiety at 10 and depression at 6 today on a 0-10 scale where 0= no symptoms. New care plan created today due to therapist entering initial plan incorrectly.     History obtained from referring provider's note on 25:  Past Psychiatric History:  Began Treatment: 6th grade  Diagnoses:Depression and Anxiety  Psychiatrist:Denies  Therapist: in highschool  Admission History:Denies  Medication Trials: Wellbutrin  mg twice daily 2022-2023 due to keeping up with 2 times daily dosing,then switched to  mg    Self Harm:  \"6th grade one time, I was depressed and remember I watched a [employer] video and she said it helped\"    Suicide Attempts:Denies   Psychosis, Anxiety, Depression:  " n/a     Patient actively participates in sessions and seems open to therapeutic interventions introduced. Patient reports utilizing coping strategies presented in sessions and appears to be calmer and less stressed recently.     Assessment    Mental Status Exam     Appearance: good hygiene and dressed appropriately for the weather  Behavior: calm  Cooperation:  engaged, cooperative, attentive, and friendly  Eye Contact:  good  Affect:  congruent  Mood: depressed and anxious  Speech: talkative  Thought Process:  organized  Thought Content: appropriate  Suicidal: denies  Homicidal:  denies  Hallucinations:  denies  Memory:  intact  Orientation:  person, place, time, and situation  Reliability:  reliable  Insight:  good  Judgment:  good    Clinical Intervention       ICD-10-CM ICD-9-CM   1. Generalized anxiety disorder  F41.1 300.02   2. Attention deficit hyperactivity disorder, combined type  F90.2 314.01   3. Major depressive disorder, recurrent episode, moderate  F33.1 296.32   4. Binge eating disorder, moderate  F50.811 307.59        Individual psychotherapy was provided utilizing CBT and person-centered techniques to build rapport, encourage expression of thoughts and feelings, support self-esteem, establish new coping skills, identify goals for treatment, acknowledge sources of feelings and behaviors, identify strengths, build confidence, assess symptoms, recognize cognitive distortions, establish therapeutic alliance, define and establish appropriate boundaries, and provide psychoeducation. Patient was encouraged to reframe thought process to accepting thoughts as they are, rather than trying to eliminate or change them.  Therapist utilized open-ended questions to encourage the development of a positive therapeutic relationship and open communication.  Therapist normalized/validated patient’s thoughts and feelings as appropriate. Reviewed patient’s recent progress in managing anxiety. Reviewed recent steps  toward self-care and self-compassion. Discussed patient's coping mechanisms for family stress. Reviewed coping skills practiced since last session. Patient rated anxiety at 10 and depression at 6 today on a 0-10 scale where 0= no symptoms. New care plan created today due to therapist entering initial plan incorrectly. Introduced/modeled defusion to assist with managing the increased anxious thoughts. Encouraged patient to share medication concerns with Lacy.    Plan   Plan & Goals     Moving forward, we will continue to build rapport and reinforce and build upon effective coping strategies utilizing CBT and person-centered techniques. Check in on progress with anxious thoughts and if she was able to change medication for ADHD.    Patient acknowledged and verbally consented to continue working toward resolving current treatment plan goals and was educated on the importance of participation in the therapeutic process.  Patient will remain compliant with medication regimen as prescribed. Discuss any medication side effects, questions or concerns with prescribing provider.  Call 911 or present to the nearest emergency room in an emergency situation.  National Suicide Prevention Lifeline: Call 988. The Lifeline provides 24/7, free and confidential support for people in distress, prevention and crisis resources.  Crisis Text Line  Text HOME To 498241    Return in about 2 weeks (around 7/7/2025).    ____________________  This document has been electronically signed by Juana Templeton LCSW  June 23, 2025 12:28 EDT    Part of this note may be an electronic transcription/translation of spoken language to printed text using the Dragon Dictation System.

## 2025-06-23 ENCOUNTER — OFFICE VISIT (OUTPATIENT)
Age: 20
End: 2025-06-23
Payer: COMMERCIAL

## 2025-06-23 ENCOUNTER — OFFICE VISIT (OUTPATIENT)
Dept: BEHAVIORAL HEALTH | Facility: CLINIC | Age: 20
End: 2025-06-23
Payer: COMMERCIAL

## 2025-06-23 VITALS
BODY MASS INDEX: 35.53 KG/M2 | DIASTOLIC BLOOD PRESSURE: 70 MMHG | SYSTOLIC BLOOD PRESSURE: 110 MMHG | HEIGHT: 60 IN | WEIGHT: 181 LBS | HEART RATE: 85 BPM

## 2025-06-23 DIAGNOSIS — F33.1 MAJOR DEPRESSIVE DISORDER, RECURRENT EPISODE, MODERATE: ICD-10-CM

## 2025-06-23 DIAGNOSIS — F41.1 GENERALIZED ANXIETY DISORDER: Primary | ICD-10-CM

## 2025-06-23 DIAGNOSIS — F41.1 GENERALIZED ANXIETY DISORDER: ICD-10-CM

## 2025-06-23 DIAGNOSIS — G44.40 DRUG-INDUCED HEADACHE, NOT ELSEWHERE CLASSIFIED, NOT INTRACTABLE: ICD-10-CM

## 2025-06-23 DIAGNOSIS — Z71.89 MEDICATION CARE PLAN DISCUSSED WITH PATIENT: ICD-10-CM

## 2025-06-23 DIAGNOSIS — F90.2 ATTENTION DEFICIT HYPERACTIVITY DISORDER, COMBINED TYPE: Primary | ICD-10-CM

## 2025-06-23 DIAGNOSIS — F50.811 BINGE EATING DISORDER, MODERATE: ICD-10-CM

## 2025-06-23 DIAGNOSIS — Z79.899 MEDICATION MANAGEMENT: ICD-10-CM

## 2025-06-23 DIAGNOSIS — F90.2 ATTENTION DEFICIT HYPERACTIVITY DISORDER, COMBINED TYPE: ICD-10-CM

## 2025-06-23 PROCEDURE — 99215 OFFICE O/P EST HI 40 MIN: CPT | Performed by: NURSE PRACTITIONER

## 2025-06-23 PROCEDURE — 90837 PSYTX W PT 60 MINUTES: CPT | Performed by: SOCIAL WORKER

## 2025-06-23 RX ORDER — HYDROXYZINE HYDROCHLORIDE 10 MG/1
10 TABLET, FILM COATED ORAL 3 TIMES DAILY PRN
Qty: 20 TABLET | Refills: 0 | Status: SHIPPED | OUTPATIENT
Start: 2025-06-23

## 2025-06-23 NOTE — PATIENT INSTRUCTIONS
"Should you want to get in touch with your provider, KAYK[facility] Ponce, please contact MY Medical Assistant, Alondra, directly at [phone].  Recommend saving Alondra's direct number in phone as this is the PREFERRED & EASIEST way to get in contact with your provider.  Please leave a voice mail if you do not get an answer and she will return your call within 24 hrs. You will NOT be able to contact provider on [facility], as Behavioral Health Providers are restricted. YOU MUST CALL [phone]  If you need to speak with the on call provider after hours or on weekends, please Contact the [facility] ([phone]) and staff will be able to page the provider on call directly.     SPECIFIC RECOMMENDATIONS:     1.      Medications discussed at this encounter:                   -  DECREASE Wellbutrin XL FROM 300 mg  mg for the next 4 days, Tues-Friday. Continue to take with Adderall XR 10 mg daily both in the morning.     Start Hydroxyzine 10 mg by mouth 3 times daily as needed to target anxiety.  Due to potential drowsiness, instructed patient to not drive, use machinery, or do anything that needs mental alertness until you know how this medication affects you.    Instructed to not take any other Antihistamine/Allergy medication such as but not limited to:  Zyrtec, Claritin, Allegra, or Benadryl within 4 hrs of taking Hydroxyzine.        2.      Psychotherapy recommendations: Continue with current therapist.      3.     Return to clinic: 5 weeks Tues. 7/29/25 at 8am     Please arrive at least 15 minutes before your scheduled appointment time to complete check in process.      IF you are scheduled for a [facility] VIDEO visit, YOU MUST COMPLETE THE \"E-CHECK IN\" PROCESS PRIOR TO BEGINNING THE VISIT, You may still complete the E-Check in for in office visits prior to appointment, you will receive multiple text/email reminders which will direct you further if needed.            "

## 2025-06-23 NOTE — TREATMENT PLAN
Multi-Disciplinary Problems (from Behavioral Health Treatment Plan)      Active Problems       Problem: ADHD (Adult)  Start Date: 06/23/25      Problem Details: The patient self-scales this problem as a 6 with 10 being the worst.        Goal Priority Start Date Expected End Date End Date    Patient will sustain attention and concentration to complete chores, and work responsibilites and increase positive interaction in all relationships. -- 06/23/25 12/22/25 --    Goal Details: Progress toward goal:  Not appropriate to rate progress toward goal since this is the initial treatment plan.        Goal Intervention Frequency Start Date End Date    Assist patient in setting responsible goals and breaking down large tasks. Each Visit 06/23/25 --    Intervention Details: Duration of treatment until remission of symptoms.  Patient will decrease procrastination in completing home and school tasks over the next 6 months, based on patient self-report utilizing a 0-10 reporting scale.         Goal Intervention Frequency Start Date End Date    Assist patient in using self monitoring checklist to improve attention, work performance, and social skills. Each Visit 06/23/25 --    Intervention Details: Duration of treatment until remission of symptoms.  Patient will create and follow lists to monitor progress, utilizing self-report to track progress over the next 6 months.                        Reviewed By       Juana Templeton LCSW 06/23/25 2447                     I have discussed and reviewed this treatment plan with the patient.

## 2025-06-23 NOTE — PROGRESS NOTES
"Subjective   Brenda Garcia is a 19 y.o. female who presents today for follow up    Referring Provider:  No referring provider defined for this encounter.    Chief Complaint:  ADHD, anxiety, depression, drug induced headache, medication management, medication care plan discussed    Answers submitted by the patient for this visit:  Problem not listed (Submitted on 6/23/2025)  Chief Complaint: Other medical problem  Reason for appointment: medicine  anorexia: No  joint pain: Yes  change in stool: No  swollen glands: No  vertigo: No  visual change: No  Onset: 1 to 6 months      History of Present Illness:   6/23/25:  Patient presents today in office since last visit patient was started on Adderall XR 10 mg for which patient reports \"I don't think its working for me, my anxiety is 3 times higher, and I am getting horrible migraines I never had before, I get dizzy and throw up, and I have to go to sleep.\"  The first 2 days felt brain was calm, though since that time symptoms have worsened. Patient felt like she needed to give time to adjust, though has not improved.  Patient takes Adderall XR 10 mg at 730-8am. Has to take melatonin at bedtime which is not new, feels insomnia is related to anxiety. The longest headache lasted 8 hours, despite cold cloth to head, dark room, and is debilitated by the severity of headaches. Patient continues to take Wellbutrin  mg every morning. Patient did try taking the Wellbutrin at night for 5 nights consecutively, though headaches continued. Patient has missed 3 days due to waking up later and has approximately 6 pills remaining. And believes she has 5 of the 150 mg Wellbutrin XL. Patient will drink a diet coke upon feeling the headache come on, and it hasn't went away. Patient does eat before taking the Adderall in the morning.     \"Anxiety is horrible, never been this bad before.\" Explains every single day feels stomach \"drop and explosion in head, thoughts wont stop going, " "and it ruins my day. Which further worsens depression. I feel like its a loop every single day.\"    ADHD:  Symptoms include inability to follow directions, inattention, need for frequent task redirection, forgetfulness, careless mistakes, losing things, and avoiding mental tasks. The symptoms occur at home and at work. The symptoms are described as Moderate in severity. The symptoms are described as unchanged. Symptoms are exacerbated by work environment, fatigue, distracting activities, conversation, and mental effort. Symptoms are relieved by nothing at this time. Associated symptoms include anxiety disorder and major depression. Current treatment includes behavior modification, a structured daily routine, educational interventions, dextroamphetamine/amphetamine (Adderall), and individual therapy. By report, Brenda is compliant all of the time.    Denies side effects of elevated heart rate, elevated blood pressure, irritability, insomnia, decreased appetite, nor feeling shaky or jittery with stimulant medication.     Depression: Patient complains of depression. She complains of anhedonia, depressed mood, difficulty concentrating, fatigue, and feelings of worthlessness/guilt  Anxiety:  The patient endorses to the following symptoms of anxiety including: excessive anxiety and worry about a number of events or activities for more days than not, restlessness or feeling keyed up, being easily fatigued, difficulty concentrating or mind going blank, irritability, and sleep disturbance which have caused impairment in important areas of daily functioning.  The patient has had symptoms of anxiety for several years, which have worsened over the last month. The patient rates their anxiety at a 10/10 on a 0-10 scale, with 10 being the worst.    BP Readings from Last 3 Encounters:   06/23/25 110/70   05/19/25 110/70   04/24/25 108/70     Pulse Readings from Last 3 Encounters:   06/23/25 85   05/19/25 104   04/24/25 78       " "  Wt Readings from Last 3 Encounters:   06/23/25 82.1 kg (181 lb)   05/19/25 82.7 kg (182 lb 6.4 oz)   04/24/25 82 kg (180 lb 12.8 oz)          5/19/25:    Answers submitted by the patient for this visit:  Problem not listed (Submitted on 5/19/2025)  Chief Complaint: Other medical problem  Reason for appointment: drug test for adderall  Onset: 1 to 6 months  Medications tried: therapy    Patient presents today in office at last visit planned to start patient on Adderall XR for management of ADHD, however, UDS confirmation of THC showed a level of \"11\", repeating UDS, pregnancy today as patient reported last marijuana use was 3/9/25. Patient reports she had a panic attack over the weekend, \"I was in my brain.\" Patient was thinking of college plan, and hasn't started masters as planned, though was able to get through it with positive thoughts.    Per chart review noted patient had reported to therapist at visit on 5/12/25 of being out of Wellbutrin  mg for approximately 1 week and was feeling more depressed, noted pharmacy had filled prescription dated 12/11/24 from PCP for #30/30 days, however, the 150 mg was noted as last dispensed in EHR 5/12/25 from prescription dated 2/27/25 per this provider, which was written for 90 days. Patient recalls while on phone with Pancho had requested before hearing the prescription number, though confirmed only taking 300 mg.  Patient was off Wellbutrin XL for approximately 1.5 weeks due to forgetting to fill. Did experience bad headaches, then started feeling more depressed, wanted to sleep and stay on couch.     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     Patient denies any further marijuana use, despite positive UDS for THC today, and when parents smoke they are in garage and patient is in the house in bedroom. Denies any passive exposure as boyfriend " does not drink or use drugs.       ADHD:  Symptoms include impulsivity, inattention, need for frequent task redirection, and forgetfulness. The symptoms occur at home, at work, and during social events.  The symptoms are described as Moderate in severity. The symptoms are described as unchanged. Symptoms are exacerbated by work environment, fatigue, distracting activities, and conversation. Symptoms are relieved by attention holding activities. Associated symptoms include anxiety disorder and major depression. Current treatment includes behavior modification, a structured daily routine, educational interventions, individual therapy, and Wellbutrin  mg. By report, Brenda is compliant most of the time.    Denies side effects of elevated heart rate, elevated blood pressure, irritability, insomnia, decreased appetite, nor feeling shaky or jittery with stimulant medication.         4/24/25:    Answers submitted by the patient for this visit:  Problem not listed (Submitted on 4/23/2025)  Chief Complaint: Other medical problem  Reason for appointment: controlled substance agreement  anorexia: No  joint pain: No  change in stool: No  joint swelling: No  swollen glands: No  vertigo: No  visual change: No  Onset: at an unknown time  Chronicity: recurrent  Frequency: constantly  Medications tried: none  Additional information: just adhd    Patient presents today in office to discuss alternative options for management of ADHD, as patient voiced at last visit of no longer smoking marijuana.  At last visit Wellbutrin XL was decreased from 450 mg to 300 mg due to suspected worsened anxiety.     At times patient will feel down, and goes to sleep then wakes up and is fine.  Has noticed she is easily irritated, angered with minuscule things-hit elbow on keyboard, computer not working correctly, scale is beeping alerting her the money she is counting will become easily angered though try's to not let it impair the rest of the  "day, though sometimes it will, though has been practicing coping strategies learned with therapist.     Patient reports anxiety is about the same, \"not having any breakdowns, its more the depression comes out of nowhere.\" Has been going to sleep at 7pm and wakes at 530 am for work. Which patient attributes to schedule change, working 6 days per week for the last 6 weeks.     Patient continues to remain free of marijuana use and does wish to start a stimulant.     ADHD:  Symptoms include inattention, need for frequent task redirection, forgetfulness, careless mistakes, losing things, and avoiding mental tasks. The symptoms occur at home and at work. The symptoms are described as Moderate in severity. The symptoms are described as unchanged. Symptoms are exacerbated by work environment, fatigue, and distracting activities. Symptoms are relieved by attention holding activities though not completely. Associated symptoms include anxiety disorder and major depression. Current treatment includes behavior modification, educational interventions, and Wellbutrin  mg. By report, Brenda is compliant all of the time.      4/11/25:   Answers submitted by the patient for this visit:  Anxiety (Submitted on 4/7/2025)  Chief Complaint: Anxiety  Visit: follow-up  Frequency: constantly  Severity: severe  depressed mood: Yes  dry mouth: No  excessive worry: Yes  insomnia: Yes  irritability: Yes  malaise/fatigue: Yes  obsessions: Yes  Sleep quality: fair  Hours of sleep per night: 7 Hours  Aggravated by: family issues, social activities, work stress  Medication compliance: %  Side effects: sexual problems    Patient presents today in office at last visit Wellbutrin XL was increased from 300 to 450 mg daily, for which patient reports not doing well with higher dose.   Patient reports calling MA on direct line on 3/27/25 that she wasn't doing well, the medicine hasn't done anything for me, I feel I been getting a little " "worse.\" Explains she is more anxious, having \"melt downs\", will start fanning hands when feeling overwhelmed, could occur while putting groceries away and being asked to do something else which may be a small task, and will start crying. Having difficulty regulating emotions, has tried to implement breathing exercises, though still starts to cry. Patient did get a new fidget toy she uses, will hug self tight to help calm self, which helps.     Patient is still taking the Wellbutrin  mg, however, feels the Lexapro is ineffective, possibly causing more anxiety, and had weaned self off with the 10 mg old dose and denied withdrawal symptoms, which was approximately 2 weeks ago.     Patient continues to participate in therapy.    Patient did stop smoking marijuana 1 month ago and 4 days ago and wanted to try an ADHD medication. Patient does report the first 2 weeks felt more irritable, something's have been harder, sleep and dreams returning, feels anxious in dream and wakes up anxious, dreams about past trauma. Wellbutrin increased dose at last visit has been ineffective in management of ADHD symptoms.     BP Readings from Last 3 Encounters:   04/11/25 118/80   04/07/25 126/82   02/27/25 122/80     Pulse Readings from Last 3 Encounters:   04/11/25 84   04/07/25 104   02/27/25 98     Wt Readings from Last 3 Encounters:   04/11/25 82.4 kg (181 lb 9.6 oz)   04/07/25 80.9 kg (178 lb 6.4 oz)   02/27/25 82.8 kg (182 lb 9.6 oz)       2/27/25:    Answers submitted by the patient for this visit:  Anxiety (Submitted on 2/27/2025)  Chief Complaint: Anxiety  Visit: follow-up  Frequency: constantly  Severity: severe  depressed mood: Yes  dry mouth: No  excessive worry: Yes  insomnia: Yes  irritability: Yes  malaise/fatigue: No  obsessions: Yes  Sleep quality: fair  Hours of sleep per night: 8 Hours  Aggravated by: family issues  Medication compliance: %  Side effects: nausea    Patient presents today in office for " "further assessment of overlapping symptoms presented at last visit.     Patient does admit to struggling with anxiety the most, which further makes other things worse.    Spending excess amount of money for 2 weeks, then later tells herself to snap out of it, and will do good then it comes back. One day will decide to go shopping or go online and buy without thinking, doesn't realize the purchase was not needed until she see's her bank account. Uses linked credit card to [employer] or if it is a big purchase, though sometimes will use [employer] for the cash back percentage on other purchases. Will find self during this time speeding but no higher than  mph.  Patient had lost drivers license and got it renewed and is more cautious about ensuring she is following the rules of the road, as patient has old drivers license which is , other times during these episodes feels she can get away with getting pulled over, do whatever you want you will be fine, don't think about the consequences.\"Plans to go to [facility] soon.  Denies sleep deprivation, has more energy while out doing things, though loves to sleep.  No one has told her she was out of character during that time.  Feels boyfriend would recognize, and has said stuff before about spending money he will ask do you know how much your spending.  Patient does pay bills on time, as patient gets anxious about bills fears she would forget to pay which would cause disappointment.     Impulsivity:   At work used to do internet orders, though would overwhelm self with the steps to do the job, \"I should have got a cart first, forgets basic steps, thinks about I should have done x,y, z first and thought about that first.\"  Food impulsively eats something, and didn't think about other meals, not planned out accordingly.    Easily distracted, often forgets what she is doing, takes awhile to get back on track with several things.     Last year took an abnormal psychology class and " "felt she met criteria for adhd or bipolar 2 and told mom, and needed to schedule an appointment. Best friend had asked if she had ever been tested for ADHD.     ADHD:   Elementary school:   Grades: struggled with math, and really struggled 5th grade and thereafter, got first C that year and had to do school on Saturday; C's maybe a B once in math  Special classes or failures: No (only in college)  Got in trouble: No  Referral for ADHD testing: No \"they just thought I used all that energy on sports, went through several sports, soccer. everyone just called me loud and hyper, just a kid being a kid.\"  Fhx: none known, though suspects sister and cousin-family doesn't believe   Presently:  Problems with attention to detail: Yes (frequently submits incomplete work, goes back to fix mistakes, overlooks or misses details, work is inaccurate) was also difficult during school  Problems with sustained attention: Yes (Difficulty remaining focused during lectures-did record a few lectures pearl year of college due to great difficulty, day dreams a lot, would try to force self to listen, conversations, lengthy reading-did some in class if peaked interest, would try though as reading unable to understand what she read  Problems listening when spoken to directly: Yes (mind seems elsewhere, even in the absences of any obvious distraction)   Failure to finish tasks: Yes happens a lot at work and home, will start on task intended, then original task never completed, and dad will text her telling her once again you forgot to complete.  Avoids/Dislikes/Reluctant to engage in tasks that require sustained mental effort: Yes (schoolwork, homework,preparing reports,completing forms, reviewing lengthy papers) procrastinates   Easily distracted: Yes (unrelated thoughts, external factors) day dreams often  Forgetting things: Yes (daily chores, running errands, returning calls,, keeping appointments) able to remember to pay bills timely; went " "to work this morning and was not scheduled despite following routine to go to work while clocking in she received a message asking if she was still wanting to clock in as patient was not on the schedule; forgot to turn off car and in store for 1.5 hrs and thought she lost her keys-had several people looking and found the keys in car while vehicle still running-which had a regular key in the ignition.   Losing things: Yes (school materials, student ID-5-6 times, wallets, keys, cell phone, last year she got a new bank card 9 times, has lost drivers license multiple times)  Hard to organize: Yes difficulty managing sequential tasks, keeping materials and belongings in order, messy, disorganized work, poor time management, fails to meet deadlines at times sometimes missed assignments, if boss at work gave deadline is able to get it done but if gave self a deadline will not meet, keeps pushing it off, poor planning, prioritizing)   Talks a lot and cutting people off:Yes \"I talk so much, its all I really do, I used to cut people off, but when I was younger I learned it was rude, I actively try to make an effort to not cut people off.\" Voted most  [facility] [employer]  Drifts off during conversations: Yes  Difficulty with Reading: Yes, has tried audio books though finds it boring, re-reads  Xiii. Difficulty watching TV/Movies: Yes, easily distracted and bored, needs to do another task to listen to a show or movie- will be coloring while watching otherwise does not follow what is going on and has to rewind, turn on captions.    Several inattentive or hyperactive-impulsive symptoms are present in 2 or more settings (home,work, school, with friends or relatives, in other activities).  Clear evidence the symptoms interfere with, or reduce the quality of, social, academic, or occupational functioning.        2/11/25:  INITIAL EVAL  Answers submitted by the patient for this visit:  Depression (Submitted on " "2/9/2025)  Chief Complaint: Depression  Visit: initial  Onset: 1 to 5 years ago  Progression since onset: worse  Frequency: constantly  Severity: severe  excessive worry: Yes  insomnia: Yes  irritability: Yes  malaise/fatigue: Yes  obsessions: Yes  hypersomnia: Yes  difficulty controlling mood: Yes  difficulty staying asleep: Yes  difficulty falling asleep: Yes  Hours of sleep per night: 6 Hours  Aggravated by: nothing, family issues, social activities    Provider introduced self, as well as credentials, and informed of provider role which will primarily include medication management of mental health conditions. Explained the difference between provider role vs. therapy/counseling services which include CBT (talk therapy) and do not include management of medications which are typically 45 minutes to 1 hr in length, however, if patient was in agreement to start therapy this provider can provide a contact list and/or refer. Patient verbalized understanding and agreed to proceed.    Patient presents today in office with a history of anxiety and depression for which treatment began in the 6th grade.  Patient is currently prescribed Wellbutrin  mg every morning ( mg twice daily 2/2022-6/2023, then changed to  mg formulation) and Lexapro 20 mg every morning (9/17/24 10 mg, increased to 20 mg 11/6/24), which have provided effectiveness, though feels medications are not as effective as they were previously.  Patient denies significant PMHX.    Patient goal of treatment \"I want to get into a mindset where I wake up everyday, and don't think I am running out time, I want my mind to stop, peace and tranquility.\"    Patient has noticed physical symptoms of anxiety- shaky, sweaty, stutters, nausea, vomits. And will also find self getting up in the middle of the night due to not eating much during the day and binge eating. Denies purging behavior.    Anxiety: goes to work, to bed on time, getting up on time. " "Patient has tried to focus on a healthier diet, though it stresses her out, then ends up binging. Patient enjoys job at [employer], gets to work with boyfriend and best friend, though doesn't understand why she gets so anxious.  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  When patient is at boyfriend house will shut self in room and avoids going out to open areas by herself as his parents are in the home.  Patient has been with boyfriend for over 2 yrs and reports his family has treated her with respect, loving family whom have welcomed patient into their lives. They have went on vacations, family gatherings, and feels she should be comfortable eating around them, helping herself to fridge, etc while in their home, but isn't.    Symptoms started approximately her sophomore year of college, 2.5 yrs ago. Patient lived on campus at [facility] and was excited about going. 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  I think he really messed me up, he did a number on my self worth.\" He was from [facility], his name is Kit, and his beauty standards are \"tiny women\", a month after they stared dating would say, \"your gonna wear that or eat that, and stop eating a week before we go to his mom's.\" He was patient first official relationship. \"I gave him my first everything.\" Explains his mother would say similar things, and felt judged on how much she ate or what she wore.  [place] break up, as patient started realizing she was not happy. Best friend would question why patient would allow him and his mother to speak to her in such a manner. \"I was scared to break up with him.\"    Scared of being judged. Especially around food, always go back to food, boyfriend name is Gallo. Will hide bag of food, drinks or he will carry food. Will not eat in front of his parents, " "will take 2 bites and then feels sick at her stomach.    Bed weight of 194 lbs was at the ED and bed may have not been zeroed out prior to weight due to fluctuation value noted.   Wt Readings from Last 3 Encounters:   02/11/25 83.2 kg (183 lb 6.4 oz)   01/15/25 88.3 kg (194 lb 10.7 oz)   11/06/24 82.3 kg (181 lb 6.4 oz)     \"Physical feeling in brain not feeling fine for so long, something off.  Spending-I usually am a really anxious spender, but recently I want this and I will get this without 2nd thought, I am not saving money at all. Thoughts I feel I would never have, why am I here, too scared to kill self; feel life is too short; do you really have enough time to do that. Everyday is constantly going back and forth with self in head. Self doubt. Simple tasks-prioritizing, then hasn't done anything productive. Loses sense of time. Forget a lot more than used too. Eye doctor appointment I thought it was yesterday but its 21st.\" Symptoms present for almost 2 yrs.     If others don't use turn signal while driving, if stuff isn't done the way it is supposed to be done then this bad thing will happen. I've only felt this way for the last 2.5 yrs. I feel I am always weighed down, I can think of things Kit said and I am instantly sick and will shut down.\"     \"Lexapro has helped, but also feels it not like it used to and now I am back at square 1. Routine is the same. Not doing what it should be doing. Possible ADHD?\"    Psych: Eating Disorder The patient admits to binge eating episodes, described as occurring nearly everyday, eating excessive amount of calories at one time in secret .    PTSD:  Persistent and exaggerated negative beliefs or expectations about oneself, others, or the world (e.g., \"I am bad,\" \"No one can be trusted,\" \"The world is completely dangerous,\" \"My whole nervous system is permanently ruined\"), Persistent, distorted cognitions about the cause or consequences of the traumatic event(s) that lead " the individual to blame himself/herself or others, Persistent negative emotional state (e.g., fear, horror, anger, guilt, or shame), Markedly diminished interest or participation in significant activities, and Persistent inability to experience positive emotions (e.g., inability to experience happiness, satisfaction, or loving feelings)  Intrusive thoughts of trauma, Triggers present, and Intense psychologic distress    Depression: Patient complains of depression. She complains of anhedonia, depressed mood, difficulty concentrating, fatigue, feelings of worthlessness/guilt, hopelessness, and insomnia     Anxiety:  The patient endorses to the following symptoms of anxiety including: excessive anxiety and worry about a number of events or activities for more days than not, restlessness or feeling keyed up, being easily fatigued, difficulty concentrating or mind going blank, irritability, and sleep disturbance which have caused impairment in important areas of daily functioning.      PHQ-9 Depression Screening  PHQ-9 Total Score: (Patient-Rptd) 216/23/2025 21   Little interest or pleasure in doing things? Nearly every day   Feeling down, depressed, or hopeless? Nearly every day   PHQ-2 Total Score 6   Trouble falling or staying asleep, or sleeping too much? More than half the days   Feeling tired or having little energy? Nearly every day   Poor appetite or overeating? More than half the days   Feeling bad about yourself - or that you are a failure or have let yourself or your family down? Nearly every day   Trouble concentrating on things, such as reading the newspaper or watching television? Nearly every day   Moving or speaking so slowly that other people could have noticed? Or the opposite - being so fidgety or restless that you have been moving around a lot more than usual? More than half the days     Thoughts that you would be better off dead, or of hurting yourself in some way? Not at all   PHQ-9 Total Score 21    If you checked off any problems, how difficult have these problems made it for you to do your work, take care of things at home, or get along with other people? Very difficult             BETI-7  Feeling nervous, anxious or on edge: (Patient-Rptd) Nearly every day  Not being able to stop or control worrying: (Patient-Rptd) Nearly every day  Worrying too much about different things: (Patient-Rptd) Nearly every day  Trouble Relaxing: (Patient-Rptd) Nearly every day  Being so restless that it is hard to sit still: (Patient-Rptd) More than half the days  Feeling afraid as if something awful might happen: (Patient-Rptd) Nearly every day  Becoming easily annoyed or irritable: (Patient-Rptd) Nearly every day  BETI 7 Total Score: (Patient-Rptd) 20  If you checked any problems, how difficult have these problems made it for you to do your work, take care of things at home, or get along with other people: (Patient-Rptd) Extremely difficult6/23/2025     The following portions of the patient's history were reviewed and updated as appropriate: allergies, current medications, past family history, past medical history, past social history, and past surgical history.     Past Surgical History:  Past Surgical History:   Procedure Laterality Date    WISDOM TOOTH EXTRACTION Bilateral        Problem List:  Patient Active Problem List   Diagnosis    Anxiety and depression    Encounter for surveillance of vaginal ring hormonal contraceptive device    Screening for viral disease    Immunization due    Routine gynecological examination       Allergy:   No Known Allergies     Discontinued Medications:  There are no discontinued medications.          Current Medications:   Current Outpatient Medications   Medication Sig Dispense Refill    amphetamine-dextroamphetamine XR (Adderall XR) 10 MG 24 hr capsule Take 1 capsule by mouth Every Morning Indications: Attention Deficit Hyperactivity Disorder 30 capsule 0    buPROPion XL (WELLBUTRIN XL) 300  "MG 24 hr tablet Take 1 tablet by mouth Every Morning. TAKE WITH 150 MG TO EQUAL 450 MG TOTAL  Indications: Attention Deficit Hyperactivity Disorder, Major Depressive Disorder 90 tablet 0    dicyclomine (BENTYL) 20 MG tablet Take 1 tablet by mouth Every 6 (Six) Hours. 20 tablet 0    etonogestrel-ethinyl estradiol (EnilloRing) 0.12-0.015 MG/24HR vaginal ring Insert vaginally and leave in place for 3 consecutive weeks, then remove for 1 week. 9 each 3    ondansetron ODT (ZOFRAN-ODT) 4 MG disintegrating tablet Place 1 tablet on the tongue Every 8 (Eight) Hours As Needed for Nausea or Vomiting. 15 tablet 0    pantoprazole (PROTONIX) 40 MG EC tablet Take 1 tablet by mouth Daily. 90 tablet 1    hydrOXYzine (ATARAX) 10 MG tablet Take 1 tablet by mouth 3 (Three) Times a Day As Needed for Anxiety. DO NOT take any other Antihistamine or Allergy medication within 4 hrs of taking Hydroxyzine.  Indications: Feeling Anxious 20 tablet 0     No current facility-administered medications for this visit.       Past Medical History:  Past Medical History:   Diagnosis Date    Anxiety     Depression     Self-injurious behavior        Past Psychiatric History:  Began Treatment:6th grade  Diagnoses:Depression and Anxiety  Psychiatrist:Denies  Therapist:in highschool  Admission History:Denies  Medication Trials:Wellbutrin  mg twice daily 2022-2023 due to keeping up with 2 times daily dosing,then switched to  mg; Lexapro up to 20 mg 2024-end of 3/2025 due to ineffectiveness, worsened anxiety which could have been related to Wellbutrin XL which was increased from 300 to 450 mg 2025   Self Harm: \"6th grade one time, I was depressed and remember I watched a [employer] video and she said it helped\"   Suicide Attempts:Denies   Psychosis, Anxiety, Depression: n/a    Substance Abuse History:   Types:smokes marijuana daily, vapes, flour, and edibles  AS OF 23 stopped using Marijuana 1 month & 4 days ago   Withdrawal " Symptoms:Denies  Longest Period Sober:Not Applicable   AA: Not applicable   Legal: none    Social History:   Martial Status:Boyfriend   Employed:Yes and If so, where [employer] working 530am-230pm  Kids:No  House:Lives in a house with parents and brother & sister   History: Denies  Access to Guns:  Yes, used to be locked but cleaning currently-old shot gun, chace guns    Social History     Socioeconomic History    Marital status: Significant Other    Number of children: 0    Highest education level: Bachelor's degree (e.g., BA, AB, BS)   Tobacco Use    Smoking status: Never     Passive exposure: Past    Smokeless tobacco: Never   Vaping Use    Vaping status: Never Used   Substance and Sexual Activity    Alcohol use: Yes     Comment: socially    Drug use: Not Currently     Types: Marijuana     Comment: last Marijuana use 3/7/25 approx. updated 4/11/25.    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 Negative for chills, diaphoresis and fever.   HENT:  Negative for congestion and sore throat.    Respiratory:  Negative for cough and shortness of breath.    Cardiovascular:  Negative for chest pain and palpitations.   Gastrointestinal:  Negative for abdominal pain, nausea and vomiting.   Genitourinary:  Negative for dysuria.   Musculoskeletal:  Negative for myalgias and neck pain.   Skin:  Negative for rash.   Neurological:  Positive for headaches. Negative for dizziness, seizures, weakness and numbness.   Psychiatric/Behavioral:  Positive for decreased concentration and sleep disturbance. Negative for confusion, self-injury and suicidal ideas. The patient is nervous/anxious. The patient is not hyperactive.          Physical Exam:  Physical Exam  Psychiatric:         Attention and Perception: Attention and perception normal.         Mood and Affect: Mood is anxious and depressed.         Speech: Speech normal.         Behavior: Behavior normal. Behavior is cooperative.         Thought Content: Thought content normal. Thought content does not include suicidal ideation. Thought content does not include suicidal plan.         Cognition and Memory: Cognition and memory normal.         Judgment: Judgment normal.         Vital Signs:   /70   Pulse 85   Ht 152.4 cm (60\")   Wt 82.1 kg (181 lb)   BMI 35.35 kg/m²          Lab Results:   Clinical Support on 05/19/2025   Component Date Value Ref Range Status    THC, Urine 05/19/2025 Negative  Cutoff=10 Final    Please note 05/19/2025 Comment   Final    Drug test results should be interpreted in the context of clinical  information. Patient metabolic variables, specific drug chemistry, and  specimen characteristics can affect test outcome. Technical  consultation is available if a test result is inconsistent with an  expected outcome.    Email:  " [email or web address]    Phone:  [phone]   Office Visit on 05/19/2025   Component Date Value Ref Range Status    Amphetamine Screen, Urine 05/19/2025 Negative  Negative Final    Barbiturates Screen, Urine 05/19/2025 Negative  Negative Final    Buprenorphine, Screen, Urine 05/19/2025 Negative  Negative Final    Benzodiazepine Screen, Urine 05/19/2025 Negative  Negative Final    Cocaine Screen, Urine 05/19/2025 Negative  Negative Final    MDMA (ECSTASY) 05/19/2025 Negative  Negative Final    Methamphetamine, Ur 05/19/2025 Negative  Negative Final    Morphine/Opiates Screen, Urine 05/19/2025 Negative  Negative Final    Methadone Screen, Urine 05/19/2025 Negative  Negative Final    Oxycodone Screen, Urine 05/19/2025 Negative  Negative Final    Phencyclidine (PCP), Urine 05/19/2025 Negative  Negative Final    THC, Screen, Urine 05/19/2025 Positive (A)  Negative Final    Lot Number 05/19/2025 [id number]   Final    Expiration Date 05/19/2025 1-16-27   Final    HCG, Urine, QL 05/19/2025 Negative  Negative Final    Lot Number 05/19/2025 [id number]   Final    Internal Positive Control 05/19/2025 Passed  Positive, Passed Final    Internal Negative Control 05/19/2025 Passed  Negative, Passed Final    Expiration Date 05/19/2025 7-24-26   Final   Lab on 04/29/2025   Component Date Value Ref Range Status    Total Cholesterol 04/29/2025 198  0 - 200 mg/dL Final    Triglycerides 04/29/2025 110  0 - 150 mg/dL Final    HDL Cholesterol 04/29/2025 50  40 - 60 mg/dL Final    LDL Cholesterol  04/29/2025 128 (H)  0 - 100 mg/dL Final    VLDL Cholesterol 04/29/2025 20  5 - 40 mg/dL Final    LDL/HDL Ratio 04/29/2025 2.52   Final    Glucose 04/29/2025 82  65 - 99 mg/dL Final    BUN 04/29/2025 8  6 - 20 mg/dL Final    Creatinine 04/29/2025 0.55 (L)  0.57 - 1.00 mg/dL Final    Sodium 04/29/2025 139  136 - 145 mmol/L Final    Potassium 04/29/2025 3.9  3.5 - 5.2 mmol/L Final    Chloride 04/29/2025 107  98 - 107 mmol/L Final    CO2  04/29/2025 20.8 (L)  22.0 - 29.0 mmol/L Final    Calcium 04/29/2025 8.8  8.6 - 10.5 mg/dL Final    Total Protein 04/29/2025 7.1  6.0 - 8.5 g/dL Final    Albumin 04/29/2025 4.0  3.5 - 5.2 g/dL Final    ALT (SGPT) 04/29/2025 23  1 - 33 U/L Final    AST (SGOT) 04/29/2025 24  1 - 32 U/L Final    Alkaline Phosphatase 04/29/2025 48  39 - 117 U/L Final    Total Bilirubin 04/29/2025 0.2  0.0 - 1.2 mg/dL Final    Globulin 04/29/2025 3.1  gm/dL Final    A/G Ratio 04/29/2025 1.3  g/dL Final    BUN/Creatinine Ratio 04/29/2025 14.5  7.0 - 25.0 Final    Anion Gap 04/29/2025 11.2  5.0 - 15.0 mmol/L Final    eGFR 04/29/2025 133.9  >60.0 mL/min/1.73 Final    TSH 04/29/2025 0.887  0.270 - 4.200 uIU/mL Final    WBC 04/29/2025 7.48  3.40 - 10.80 10*3/mm3 Final    RBC 04/29/2025 4.53  3.77 - 5.28 10*6/mm3 Final    Hemoglobin 04/29/2025 12.6  12.0 - 15.9 g/dL Final    Hematocrit 04/29/2025 38.5  34.0 - 46.6 % Final    MCV 04/29/2025 85.0  79.0 - 97.0 fL Final    MCH 04/29/2025 27.8  26.6 - 33.0 pg Final    MCHC 04/29/2025 32.7  31.5 - 35.7 g/dL Final    RDW 04/29/2025 12.5  12.3 - 15.4 % Final    RDW-SD 04/29/2025 39.8  37.0 - 54.0 fl Final    MPV 04/29/2025 10.3  6.0 - 12.0 fL Final    Platelets 04/29/2025 241  140 - 450 10*3/mm3 Final    Neutrophil % 04/29/2025 50.5  42.7 - 76.0 % Final    Lymphocyte % 04/29/2025 43.6  19.6 - 45.3 % Final    Monocyte % 04/29/2025 4.5 (L)  5.0 - 12.0 % Final    Eosinophil % 04/29/2025 0.9  0.3 - 6.2 % Final    Basophil % 04/29/2025 0.4  0.0 - 1.5 % Final    Immature Grans % 04/29/2025 0.1  0.0 - 0.5 % Final    Neutrophils, Absolute 04/29/2025 3.77  1.70 - 7.00 10*3/mm3 Final    Lymphocytes, Absolute 04/29/2025 3.26 (H)  0.70 - 3.10 10*3/mm3 Final    Monocytes, Absolute 04/29/2025 0.34  0.10 - 0.90 10*3/mm3 Final    Eosinophils, Absolute 04/29/2025 0.07  0.00 - 0.40 10*3/mm3 Final    Basophils, Absolute 04/29/2025 0.03  0.00 - 0.20 10*3/mm3 Final    Immature Grans, Absolute 04/29/2025 0.01  0.00 - 0.05  10*3/mm3 Final   Results Follow-Up on 04/24/2025   Component Date Value Ref Range Status    THC, Urine 04/24/2025 Positive (A)   Final    Carboxy THC, Urine 04/24/2025 11  Cutoff=10 ng/mL Final    Please note 04/24/2025 Comment   Final    Drug test results should be interpreted in the context of clinical  information. Patient metabolic variables, specific drug chemistry, and  specimen characteristics can affect test outcome. Technical  consultation is available if a test result is inconsistent with an  expected outcome.    Email:  [email or web address]    Phone:  [phone]   Office Visit on 04/24/2025   Component Date Value Ref Range Status    Amphetamine Screen, Urine 04/24/2025 Negative  Negative Final    Barbiturates Screen, Urine 04/24/2025 Negative  Negative Final    Buprenorphine, Screen, Urine 04/24/2025 Negative  Negative Final    Benzodiazepine Screen, Urine 04/24/2025 Negative  Negative Final    Cocaine Screen, Urine 04/24/2025 Negative  Negative Final    MDMA (ECSTASY) 04/24/2025 Negative  Negative Final    Methamphetamine, Ur 04/24/2025 Negative  Negative Final    Morphine/Opiates Screen, Urine 04/24/2025 Negative  Negative Final    Methadone Screen, Urine 04/24/2025 Negative  Negative Final    Oxycodone Screen, Urine 04/24/2025 Negative  Negative Final    Phencyclidine (PCP), Urine 04/24/2025 Negative  Negative Final    THC, Screen, Urine 04/24/2025 Positive (A)  Negative Final    Lot Number 04/24/2025 [id number]   Final    Expiration Date 04/24/2025 11-7-26   Final    HCG, Urine, QL 04/24/2025 Negative  Negative Final    Lot Number 04/24/2025 #[id number]   Final    Internal Positive Control 04/24/2025 Passed  Positive, Passed Final    Internal Negative Control 04/24/2025 Passed  Negative, Passed Final    Expiration Date 04/24/2025 5-26-26   Final   Office Visit on 04/07/2025   Component Date Value Ref Range Status    Color 04/07/2025 Yellow  Yellow, Straw, Dark Yellow, Sharita Final     Clarity, UA 04/07/2025 Clear  Clear Final    Specific Gravity  04/07/2025 1.020  1.005 - 1.030 Final    pH, Urine 04/07/2025 8.0  5.0 - 8.0 Final    Leukocytes 04/07/2025 Negative  Negative Final    Nitrite, UA 04/07/2025 Negative  Negative Final    Protein, POC 04/07/2025 Negative  Negative mg/dL Final    Glucose, UA 04/07/2025 Negative  Negative mg/dL Final    Ketones, UA 04/07/2025 Negative  Negative Final    Urobilinogen, UA 04/07/2025 0.2 E.U./dL  Normal, 0.2 E.U./dL Final    Bilirubin 04/07/2025 Negative  Negative Final    Blood, UA 04/07/2025 Trace (A)  Negative Final    Lot Number 04/07/2025 [id number]   Final    Expiration Date 04/07/2025 10/31/25   Final    Diagnosis 04/07/2025 Comment   Final    NEGATIVE FOR INTRAEPITHELIAL LESION OR MALIGNANCY.    Specimen adequacy: 04/07/2025 Comment   Final    Satisfactory for evaluation.  Endocervical and/or squamous metaplastic  cells (endocervical component) are present.    Clinician Provided ICD-10: 04/07/2025 Comment   Final    Z01.419    Performed by: 04/07/2025 Comment   Corrected    Timo Niño, Cytologist ([facility])    . 04/07/2025 .   Final    Note: 04/07/2025 Comment   Final    The Pap smear is a screening test designed to aid in the detection of  premalignant and malignant conditions of the uterine cervix.  It is not a  diagnostic procedure and should not be used as the sole means of detecting  cervical cancer.  Both false-positive and false-negative reports do occur.   Admission on 01/15/2025, Discharged on 01/15/2025   Component Date Value Ref Range Status    Glucose 01/15/2025 105 (H)  65 - 99 mg/dL Final    BUN 01/15/2025 6  6 - 20 mg/dL Final    Creatinine 01/15/2025 0.60  0.57 - 1.00 mg/dL Final    Sodium 01/15/2025 137  136 - 145 mmol/L Final    Potassium 01/15/2025 3.6  3.5 - 5.2 mmol/L Final    Chloride 01/15/2025 105  98 - 107 mmol/L Final    CO2 01/15/2025 18.8 (L)  22.0 - 29.0 mmol/L Final    Calcium 01/15/2025 8.8  8.6 - 10.5 mg/dL Final    Total  Protein 01/15/2025 7.6  6.0 - 8.5 g/dL Final    Albumin 01/15/2025 4.1  3.5 - 5.2 g/dL Final    ALT (SGPT) 01/15/2025 22  1 - 33 U/L Final    AST (SGOT) 01/15/2025 22  1 - 32 U/L Final    Alkaline Phosphatase 01/15/2025 58  39 - 117 U/L Final    Total Bilirubin 01/15/2025 0.4  0.0 - 1.2 mg/dL Final    Globulin 01/15/2025 3.5  gm/dL Final    A/G Ratio 01/15/2025 1.2  g/dL Final    BUN/Creatinine Ratio 01/15/2025 10.0  7.0 - 25.0 Final    Anion Gap 01/15/2025 13.2  5.0 - 15.0 mmol/L Final    eGFR 01/15/2025 131.2  >60.0 mL/min/1.73 Final    Lipase 01/15/2025 29  13 - 60 U/L Final    Color, UA 01/15/2025 Yellow  Yellow, Straw Final    Appearance, UA 01/15/2025 Clear  Clear Final    pH, UA 01/15/2025 5.5  5.0 - 8.0 Final    Specific Gravity, UA 01/15/2025 1.019  1.005 - 1.030 Final    Glucose, UA 01/15/2025 Negative  Negative Final    Ketones, UA 01/15/2025 Negative  Negative Final    Bilirubin, UA 01/15/2025 Negative  Negative Final    Blood, UA 01/15/2025 Small (1+) (A)  Negative Final    Protein, UA 01/15/2025 Negative  Negative Final    Leuk Esterase, UA 01/15/2025 Negative  Negative Final    Nitrite, UA 01/15/2025 Negative  Negative Final    Urobilinogen, UA 01/15/2025 0.2 E.U./dL  0.2 - 1.0 E.U./dL Final    HCG Quantitative 01/15/2025 <0.50  mIU/mL Final    Extra Tube 01/15/2025 Hold for add-ons.   Final    Auto resulted.    Extra Tube 01/15/2025 hold for add-on   Final    Auto resulted    Extra Tube 01/15/2025 Hold for add-ons.   Final    Auto resulted.    Extra Tube 01/15/2025 Hold for add-ons.   Final    Auto resulted    WBC 01/15/2025 12.77 (H)  3.40 - 10.80 10*3/mm3 Final    RBC 01/15/2025 4.91  3.77 - 5.28 10*6/mm3 Final    Hemoglobin 01/15/2025 13.1  12.0 - 15.9 g/dL Final    Hematocrit 01/15/2025 40.9  34.0 - 46.6 % Final    MCV 01/15/2025 83.3  79.0 - 97.0 fL Final    MCH 01/15/2025 26.7  26.6 - 33.0 pg Final    MCHC 01/15/2025 32.0  31.5 - 35.7 g/dL Final    RDW 01/15/2025 13.6  12.3 - 15.4 % Final     RDW-SD 01/15/2025 41.2  37.0 - 54.0 fl Final    MPV 01/15/2025 10.5  6.0 - 12.0 fL Final    Platelets 01/15/2025 276  140 - 450 10*3/mm3 Final    Neutrophil % 01/15/2025 82.5 (H)  42.7 - 76.0 % Final    Lymphocyte % 01/15/2025 12.7 (L)  19.6 - 45.3 % Final    Monocyte % 01/15/2025 3.7 (L)  5.0 - 12.0 % Final    Eosinophil % 01/15/2025 0.3  0.3 - 6.2 % Final    Basophil % 01/15/2025 0.3  0.0 - 1.5 % Final    Immature Grans % 01/15/2025 0.5  0.0 - 0.5 % Final    Neutrophils, Absolute 01/15/2025 10.54 (H)  1.70 - 7.00 10*3/mm3 Final    Lymphocytes, Absolute 01/15/2025 1.62  0.70 - 3.10 10*3/mm3 Final    Monocytes, Absolute 01/15/2025 0.47  0.10 - 0.90 10*3/mm3 Final    Eosinophils, Absolute 01/15/2025 0.04  0.00 - 0.40 10*3/mm3 Final    Basophils, Absolute 01/15/2025 0.04  0.00 - 0.20 10*3/mm3 Final    Immature Grans, Absolute 01/15/2025 0.06 (H)  0.00 - 0.05 10*3/mm3 Final    nRBC 01/15/2025 0.0  0.0 - 0.2 /100 WBC Final    RBC, UA 01/15/2025 None Seen  None Seen, 0-2 /HPF Final    WBC, UA 01/15/2025 None Seen  None Seen, 0-2 /HPF Final    Bacteria, UA 01/15/2025 None Seen  None Seen /HPF Final    Squamous Epithelial Cells, UA 01/15/2025 3-6 (A)  None Seen, 0-2 /HPF Final    Hyaline Casts, UA 01/15/2025 None Seen  None Seen /LPF Final    Methodology 01/15/2025 Automated Microscopy   Final       EKG Results:  No orders to display       Imaging Results:  CT Abdomen Pelvis With Contrast    Result Date: 1/15/2025  Impression: 1.No acute intra-abdominal or intrapelvic abnormality identified. Electronically Signed: Abhi Jauregui MD  1/15/2025 12:51 PM EST  Workstation ID: [id number]        Assessment & Plan   Diagnoses and all orders for this visit:    1. Attention deficit hyperactivity disorder, combined type (Primary)    2. Generalized anxiety disorder  -     hydrOXYzine (ATARAX) 10 MG tablet; Take 1 tablet by mouth 3 (Three) Times a Day As Needed for Anxiety. DO NOT take any other Antihistamine or Allergy medication  within 4 hrs of taking Hydroxyzine.  Indications: Feeling Anxious  Dispense: 20 tablet; Refill: 0    3. Major depressive disorder, recurrent episode, moderate    4. Drug-induced headache, not elsewhere classified, not intractable    5. Medication management    6. Medication care plan discussed with patient                Visit Diagnoses:    ICD-10-CM ICD-9-CM   1. Attention deficit hyperactivity disorder, combined type  F90.2 314.01   2. Generalized anxiety disorder  F41.1 300.02   3. Major depressive disorder, recurrent episode, moderate  F33.1 296.32   4. Drug-induced headache, not elsewhere classified, not intractable  G44.40 339.3     E980.5   5. Medication management  Z79.899 V58.69   6. Medication care plan discussed with patient  Z71.89 V65.49               PLAN:  Safety: No acute safety concerns  Therapy: Currently Seeing a Therapist [facility] with Juana Templeton LCSW  Risk Assessment: Risk of self-harm acutely is moderate.  Risk factors include anxiety disorder and mood disorder, access to guns/weapons, daily marijuana use.  Protective factors include no family history, no present SI, no history of suicide attempts or self-harm in the past, healthcare seeking, future orientation, willingness to engage in care.  Risk of self-harm chronically is also moderate, but could be further elevated in the event of treatment noncompliance and/or AODA.  Meds:  -DECREASE Wellbutrin XL FROM 300 mg  mg by mouth daily in the morning FOR THE NEXT 4 DAYS, Tues-Friday, to target depression, anxiety, attention and concentration. Discussed all risks, benefits, alternatives, and side effects of Bupropion including but not limited to GI upset (N/V/D, constipation), tachycardia, diaphoresis, weight loss, decreased appetite, agitation, dizziness, headache, insomnia, tremor, blurred vision, anorexia, increased blood pressure and/or heart rate, activation of anupam or hypomania, CNS stimulation and neuropsychiatric  effects, ocular effects, seizure risk, withdrawal syndrome following abrupt discontinuation, and activation of suicidal ideation and behavior. Discussed the need for patient to immediately call the office for any new or worsening symptoms, such as worsening depression; feeling nervous or restless; suicidal thoughts or actions; or other changes changes in mood or behavior, and all other concerns. Patient educated on medication compliance. Patient verbalized understanding and is agreeable to taking Bupropion. Addressed all questions and concerns. Patient has 5 of the 150 mg dose available and to contact provider today if not accurate.     -Continue Adderall XR 10 mg by mouth daily in the morning to target symptoms associated with ADHD. Risks, benefits, side effects discussed with patient including elevated heart rate, elevated blood pressure, irritability, insomnia, sexual dysfunction, appetite suppressing properties, psychosis.  After discussion of these risks and benefits, the patient voiced understanding and agreed to proceed. Kenney reviewed, last dispensed 5/24/25.  Patient reports having approximately 6 pills remaining as patient did not take a few days due to awakening at later hour.  -Controlled substance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

## 2025-06-27 ENCOUNTER — TELEPHONE (OUTPATIENT)
Dept: BEHAVIORAL HEALTH | Facility: CLINIC | Age: 20
End: 2025-06-27
Payer: COMMERCIAL

## 2025-06-27 NOTE — TELEPHONE ENCOUNTER
"Patient VM advising she was just calling in to give you an update on her meds so far since the change. Patient says she has been taking her regular Adderall with the Wellbutrin,still a little HA but no where near the HA she got when taking the 300mg of Wellbutrin.  The anxiety medication has been working great for her. \"That is her Update so far just wanted to let Lacy know.\" No need to callback  "

## 2025-07-01 DIAGNOSIS — F90.2 ATTENTION DEFICIT HYPERACTIVITY DISORDER, COMBINED TYPE: ICD-10-CM

## 2025-07-02 RX ORDER — DEXTROAMPHETAMINE SACCHARATE, AMPHETAMINE ASPARTATE MONOHYDRATE, DEXTROAMPHETAMINE SULFATE AND AMPHETAMINE SULFATE 2.5; 2.5; 2.5; 2.5 MG/1; MG/1; MG/1; MG/1
10 CAPSULE, EXTENDED RELEASE ORAL EVERY MORNING
Qty: 30 CAPSULE | Refills: 0 | Status: SHIPPED | OUTPATIENT
Start: 2025-07-03

## 2025-07-02 NOTE — TELEPHONE ENCOUNTER
Order is ready for signature, CSA, UDS, Pregnancy 5/19/25. MANUAL DELBERT 7/2/25 at 0907, which remained in pending status until 1108am, last dispensed 5/24/25 #30/30 days.

## 2025-07-02 NOTE — PROGRESS NOTES
"Progress Note    Date: 07/07/2025  Time In: 9:01  Time Out: 9:57    Patient Legal Name: Brenda Garcia  Patient Age: 19 y.o.    Mode of visit: In person  Location of provider: [address], [place] [address]  Location of patient: Office      CHIEF COMPLAINT: anxiety, depression, adhd     Subjective   History of Present Illness   Brenda \"Stephanie\" is a 19 y.o. female who presents today as a follow-up for continued psychotherapy. Patient reported \"it's been a crazy 2 weeks\" due to her mom suffering with back pain  Patient stated she has been frustrated at times with things going on at home.  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 Patient advised she is torn between helping at home and taking care of herself.  Patient reported feeling traumatized by her mom's struggle with back pain.  Patient shared she gets frustrated feeling some family members could be doing more as she is having to take on more in the home. Patient described how she has been using reframing and positive self-talk to manage anxiety in dealing with her family. Patient reported having one panic attack since last session but calmed herself. Patient advised she is adjusting well to recent medication changes. Patient reported feeling relief at being able to  vent about stress at home. Patient stated she is looking forward to her upcoming beach vacation. Patient is voluntarily requesting to participate in outpatient therapy at [facility].  Patient rated anxiety at 10 and depression at 9 today on a 0-10 scale where 0= no symptoms (numbers up today due to stress at home).     History obtained from referring provider's note on 2/11/25:  Past Psychiatric History:  Began Treatment: 6th grade  Diagnoses:Depression and Anxiety  Psychiatrist:Denies  Therapist: in highschool  Admission History:Denies  Medication Trials: Wellbutrin  mg " "twice daily 2022-2023 due to keeping up with 2 times daily dosing,then switched to  mg    Self Harm:  \"6th grade one time, I was depressed and remember I watched a [employer] video and she said it helped\"    Suicide Attempts:Denies   Psychosis, Anxiety, Depression:  n/a    Assessment    Mental Status Exam     Appearance: good hygiene and dressed appropriately for the weather  Behavior: calm  Cooperation:  engaged, cooperative, attentive, and friendly  Eye Contact:  good  Affect:  congruent  Mood: expressive, depressed, and anxious  Speech: talkative  Thought Process:  organized  Thought Content: appropriate  Suicidal: denies  Homicidal:  denies  Hallucinations:  denies  Memory:  intact  Orientation:  person, place, time, and situation  Reliability:  reliable  Insight:  good  Judgment:  good    Clinical Intervention       ICD-10-CM ICD-9-CM   1. Generalized anxiety disorder  F41.1 300.02   2. Attention deficit hyperactivity disorder, combined type  F90.2 314.01   3. Major depressive disorder, recurrent episode, moderate  F33.1 296.32        Individual psychotherapy was provided utilizing CBT and person-centered techniques to build rapport, encourage expression of thoughts and feelings, support self-esteem, establish new coping skills, increase acceptance, identify triggers, acknowledge sources of feelings and behaviors, build confidence, assess symptoms, recognize cognitive distortions, and provide psychoeducation. Allowed patient to freely discuss issues without interruption or judgement with unconditional positive regard, active listening skills, and empathy. Therapist utilized open-ended questions to encourage the development of a positive therapeutic relationship and open communication.  Therapist normalized/validated patient’s thoughts and feelings as appropriate. Processed patient's feelings about the chaos at home. Discussed having realistic expectations of family based on her hx with them. " Shared affirmations handouts today. Patient rated anxiety at 10 and depression at 9 today on a 0-10 scale where 0= no symptoms. Reviewed patient’s recent progress in managing anxiety and praised patient for effectively using coping strategies learned in session.    Plan   Plan & Goals     Moving forward, we will continue to build rapport and reinforce and build upon effective coping strategies utilizing CBT and person-centered techniques.    Patient acknowledged and verbally consented to continue working toward resolving current treatment plan goals and was educated on the importance of participation in the therapeutic process.  Patient will remain compliant with medication regimen as prescribed. Discuss any medication side effects, questions or concerns with prescribing provider.  Call 911 or present to the nearest emergency room in an emergency situation.  National Suicide Prevention Lifeline: Call 988. The Lifeline provides 24/7, free and confidential support for people in distress, prevention and crisis resources.  Crisis Text Line  Text HOME To 354978    Return in about 2 weeks (around 7/21/2025).    ____________________  This document has been electronically signed by Juana Templeton LCSW  July 7, 2025 10:05 EDT    Part of this note may be an electronic transcription/translation of spoken language to printed text using the Dragon Dictation System.

## 2025-07-02 NOTE — TELEPHONE ENCOUNTER
REFILL REQUEST:    amphetamine-dextroamphetamine XR (Adderall XR) 10 MG 24 hr capsule (05/24/2025)     F/UP: 07/29/2025.  LOV: 06/23/2025.    LAST UDS:  POC Medline 12 Panel Urine Drug Screen (05/19/2025 10:44)     LAST CSA:  BEHAVIORAL HEALTH - SCAN - CONTROLLED SUBSTANCE AGREEMENT ANGELL,BEHAVIORAL HEALTH,5/19/2025 (05/19/2025)

## 2025-07-07 ENCOUNTER — OFFICE VISIT (OUTPATIENT)
Age: 20
End: 2025-07-07
Payer: COMMERCIAL

## 2025-07-07 DIAGNOSIS — F90.2 ATTENTION DEFICIT HYPERACTIVITY DISORDER, COMBINED TYPE: ICD-10-CM

## 2025-07-07 DIAGNOSIS — F41.1 GENERALIZED ANXIETY DISORDER: Primary | ICD-10-CM

## 2025-07-07 DIAGNOSIS — F33.1 MAJOR DEPRESSIVE DISORDER, RECURRENT EPISODE, MODERATE: ICD-10-CM

## 2025-07-07 PROCEDURE — 90837 PSYTX W PT 60 MINUTES: CPT | Performed by: SOCIAL WORKER

## 2025-07-21 DIAGNOSIS — F41.1 GENERALIZED ANXIETY DISORDER: ICD-10-CM

## 2025-07-21 DIAGNOSIS — R12 HEARTBURN: ICD-10-CM

## 2025-07-22 ENCOUNTER — OFFICE VISIT (OUTPATIENT)
Age: 20
End: 2025-07-22
Payer: COMMERCIAL

## 2025-07-22 DIAGNOSIS — F33.1 MAJOR DEPRESSIVE DISORDER, RECURRENT EPISODE, MODERATE: ICD-10-CM

## 2025-07-22 DIAGNOSIS — F50.811 BINGE EATING DISORDER, MODERATE: ICD-10-CM

## 2025-07-22 DIAGNOSIS — F90.2 ATTENTION DEFICIT HYPERACTIVITY DISORDER, COMBINED TYPE: ICD-10-CM

## 2025-07-22 DIAGNOSIS — F41.1 GENERALIZED ANXIETY DISORDER: Primary | ICD-10-CM

## 2025-07-22 RX ORDER — PANTOPRAZOLE SODIUM 40 MG/1
40 TABLET, DELAYED RELEASE ORAL DAILY
Qty: 90 TABLET | Refills: 1 | Status: SHIPPED | OUTPATIENT
Start: 2025-07-22

## 2025-07-22 RX ORDER — HYDROXYZINE HYDROCHLORIDE 10 MG/1
10 TABLET, FILM COATED ORAL 3 TIMES DAILY PRN
Qty: 20 TABLET | Refills: 0 | Status: SHIPPED | OUTPATIENT
Start: 2025-07-22

## 2025-07-22 NOTE — TELEPHONE ENCOUNTER
REFILL REQUEST:       hydrOXYzine (ATARAX) 10 MG tablet (06/23/2025)      F/UP: 07/29/2025.  LOV: 06/23/2025.

## 2025-07-22 NOTE — PROGRESS NOTES
"Progress Note    Date: 2025  Time In: 1:00  Time Out: 1:58    Patient Legal Name: Brenda Garcia  Patient Age: 19 y.o.    Mode of visit: In person  Location of provider: [address] [address], [place] [address]  Location of patient: Office      CHIEF COMPLAINT: anxiety, depression, adhd     Subjective   History of Present Illness   Brenda \"Stephanie\" is a 19 y.o. female who presents today as a follow-up for continued psychotherapy. 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  Patient expressed confidence in the interview. \"I'm very happy about that.\" Patient stated she did have some doubts after the interview (\"imposter syndrome\").  Patient shared she had a great vacation with her boyfriend's family but did have some frustrations with her boyfriend's younger sister on the trip.  Patient reported wanting to intervene in the child's bx, noting it is difficult to stand by and watch without stepping in.  Patient noted some concerns about the conservative nature of her boyfriend's family stating it makes her uncomfortable at times she does like all of them. Patient is voluntarily requesting to participate in outpatient therapy at [facility].  Patient rated anxiety at 7 and depression at 4 today on a 0-10 scale where 0= no symptoms.       History obtained from referring provider's note on 25:  Past Psychiatric History:  Began Treatment: 6th grade  Diagnoses:Depression and Anxiety  Psychiatrist:Cristo  Therapist: in highschool  Admission History:Denies  Medication Trials: Wellbutrin  mg twice daily 2022-2023 due to keeping up with 2 times daily dosing,then switched to  mg    Self Harm:  \"6th grade one time, I was depressed and remember I watched a [employer] video and she said it helped\"    Suicide Attempts:Denies   Psychosis, Anxiety, Depression:  n/a    Assessment    Mental Status Exam     Appearance: " "good hygiene and dressed appropriately for the weather  Behavior: calm  Cooperation:  engaged, cooperative, attentive, and friendly  Eye Contact:  good  Affect:  congruent  Mood: expressive  Speech: talkative  Thought Process:  organized  Thought Content: appropriate  Suicidal: denies  Homicidal:  denies  Hallucinations:  denies  Memory:  intact  Orientation:  person, place, time, and situation  Reliability:  reliable  Insight:  good  Judgment:  good    Clinical Intervention       ICD-10-CM ICD-9-CM   1. Generalized anxiety disorder  F41.1 300.02   2. Attention deficit hyperactivity disorder, combined type  F90.2 314.01   3. Major depressive disorder, recurrent episode, moderate  F33.1 296.32   4. Binge eating disorder, moderate  F50.811 307.59        Individual psychotherapy was provided utilizing CBT and person-centered techniques to build rapport, encourage expression of thoughts and feelings, support self-esteem, discuss values and strengths, acknowledge sources of feelings and behaviors, identify strengths, build confidence, assess symptoms, and establish therapeutic alliance.  Therapist utilized open-ended questions to encourage the development of a positive therapeutic relationship and open communication.  Therapist normalized/validated patient’s thoughts and feelings as appropriate. Praised patient for expressing her needs/expectations to her boyfriend on vacation regarding watching his sister. Patient rated anxiety at 7 and depression at 4 today on a 0-10 scale where 0= no symptoms. 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    Plan   Plan & Goals     Moving forward, we will continue to build rapport and reinforce and build upon effective coping strategies utilizing CBT and person-centered techniques.    Patient acknowledged and verbally consented to continue working toward " resolving current treatment plan goals and was educated on the importance of participation in the therapeutic process.  Patient will remain compliant with medication regimen as prescribed. Discuss any medication side effects, questions or concerns with prescribing provider.  Call 911 or present to the nearest emergency room in an emergency situation.  National Suicide Prevention Lifeline: Call 988. The Lifeline provides 24/7, free and confidential support for people in distress, prevention and crisis resources.  Crisis Text Line  Text HOME To 701936    Return in about 2 weeks (around 8/5/2025).    ____________________  This document has been electronically signed by Juana Templeton LCSW  July 22, 2025 17:30 EDT    Part of this note may be an electronic transcription/translation of spoken language to printed text using the Dragon Dictation System.

## 2025-07-24 DIAGNOSIS — F41.1 GENERALIZED ANXIETY DISORDER: ICD-10-CM

## 2025-07-24 DIAGNOSIS — F33.1 MAJOR DEPRESSIVE DISORDER, RECURRENT EPISODE, MODERATE: ICD-10-CM

## 2025-07-24 DIAGNOSIS — F90.2 ATTENTION DEFICIT HYPERACTIVITY DISORDER, COMBINED TYPE: ICD-10-CM

## 2025-07-24 RX ORDER — BUPROPION HYDROCHLORIDE 150 MG/1
TABLET ORAL
Qty: 30 TABLET | Refills: 1 | OUTPATIENT
Start: 2025-07-24

## 2025-07-24 NOTE — TELEPHONE ENCOUNTER
buPROPion HCL  MG TABLET     The original prescription was discontinued on 4/11/2025 by Lacy Torre APRN for the following reason: Not Efficacious. Renewing this prescription may not be appropriate.   Last ordered: 4 months ago (2/27/2025) by KAYKAY Ponce       FOLLOW UP 07/29/2025

## 2025-07-29 ENCOUNTER — OFFICE VISIT (OUTPATIENT)
Dept: BEHAVIORAL HEALTH | Facility: CLINIC | Age: 20
End: 2025-07-29
Payer: COMMERCIAL

## 2025-07-29 VITALS
WEIGHT: 183.6 LBS | BODY MASS INDEX: 36.05 KG/M2 | HEART RATE: 85 BPM | DIASTOLIC BLOOD PRESSURE: 82 MMHG | HEIGHT: 60 IN | SYSTOLIC BLOOD PRESSURE: 110 MMHG

## 2025-07-29 DIAGNOSIS — Z79.899 MEDICATION MANAGEMENT: ICD-10-CM

## 2025-07-29 DIAGNOSIS — F90.2 ATTENTION DEFICIT HYPERACTIVITY DISORDER, COMBINED TYPE: ICD-10-CM

## 2025-07-29 DIAGNOSIS — Z71.89 MEDICATION CARE PLAN DISCUSSED WITH PATIENT: ICD-10-CM

## 2025-07-29 DIAGNOSIS — Z79.899 MEDICATION DOSE INCREASED: ICD-10-CM

## 2025-07-29 DIAGNOSIS — F90.2 ATTENTION DEFICIT HYPERACTIVITY DISORDER, COMBINED TYPE: Primary | ICD-10-CM

## 2025-07-29 DIAGNOSIS — F41.1 GENERALIZED ANXIETY DISORDER: Primary | ICD-10-CM

## 2025-07-29 RX ORDER — BUSPIRONE HYDROCHLORIDE 5 MG/1
TABLET ORAL
Qty: 56 TABLET | Refills: 0 | Status: SHIPPED | OUTPATIENT
Start: 2025-07-29 | End: 2025-08-28

## 2025-07-29 RX ORDER — DEXTROAMPHETAMINE SACCHARATE, AMPHETAMINE ASPARTATE MONOHYDRATE, DEXTROAMPHETAMINE SULFATE AND AMPHETAMINE SULFATE 3.75; 3.75; 3.75; 3.75 MG/1; MG/1; MG/1; MG/1
15 CAPSULE, EXTENDED RELEASE ORAL EVERY MORNING
Qty: 30 CAPSULE | Refills: 0 | Status: SHIPPED | OUTPATIENT
Start: 2025-07-30

## 2025-07-29 NOTE — PATIENT INSTRUCTIONS
"Should you want to get in touch with your provider, KAYKAY Ponce, please contact MY Medical Assistant, Alondra, directly at [phone].  Recommend saving Alondra's direct number in phone as this is the PREFERRED & EASIEST way to get in contact with your provider.  Please leave a voice mail if you do not get an answer and she will return your call within 24 hrs. You will NOT be able to contact provider on View[facility], as Behavioral Health Providers are restricted. YOU MUST CALL [phone]  If you need to speak with the on call provider after hours or on weekends, please Contact the [facility] ([phone]) and staff will be able to page the provider on call directly.     SPECIFIC RECOMMENDATIONS:     1.      Medications discussed at this encounter:                   -  Start  Buspar 5 mg by mouth daily for 4 days THEN INCREASE TO 5 mg by mouth twice daily to target anxiety. Absorption is affected by food, so administration with or without food should be consistent.     -INCREASE Adderall XR FROM 10 mg TO 15 mg by mouth daily in the morning      2.      Psychotherapy recommendations: Continue with current therapist.      3.     Return to clinic: 4 weeks Friday 8/29/25 at 340 pm     Please arrive at least 15 minutes before your scheduled appointment time to complete check in process.      IF you are scheduled for a View3 VIDEO visit, YOU MUST COMPLETE THE \"E-CHECK IN\" PROCESS PRIOR TO BEGINNING THE VISIT, You may still complete the E-Check in for in office visits prior to appointment, you will receive multiple text/email reminders which will direct you further if needed.            "

## 2025-07-29 NOTE — PROGRESS NOTES
"Subjective   Brenda Garcia is a 19 y.o. female who presents today for follow up    Referring Provider:  No referring provider defined for this encounter.    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 School starts 8/13/25, staff starts 8/4/25, and is in training, quit working at [employer].     At last visit Wellbutrin XL was decreased from 300 mg to 150 mg for a few days and tolerated taper well. \"I have done great without it, I have had one major headache since stopping vs 4 per week.\"  Patient does feel the Adderall may need a dose increase.   Patient was also started on Hydroxyzine 10 mg 3 times daily as needed which patient reports effective for anxiety, though doesn't take routinely in the morning due to drowsiness, will take with severe anxiety or towards night time. Has had some anxiety regarding professional development training. \"I like it, I want something I can take in the morning that doesn't make me go to sleep.\"    Patient brother and mother both take Buspar and is willing to try.   Patient reports taking the Adderall XR 10 mg at 630-7am every single morning and by 12 noon, feels focus is gone, brain scattered. Patient reports from 7a-12noon notices attention, focus, ability to complete tasks are not difficult.      ADHD:  Symptoms include " "inability to follow directions, inattention, need for frequent task redirection, forgetfulness, and careless mistakes. The symptoms occur at home, at work, and during social events. The symptoms are described as Moderate in severity. The symptoms are described as gradually worsening. Symptoms are exacerbated by group meetings, work environment, fatigue, distracting activities, conversation, and mental effort. Symptoms are relieved by attention holding activities and stimulant medication. Associated symptoms include anxiety disorder and major depression. Current treatment includes behavior modification, a structured daily routine, educational interventions, dextroamphetamine/amphetamine (Adderall), and individual therapy. By report, Brenda is compliant all of the time.    Denies side effects of elevated heart rate, elevated blood pressure, irritability, insomnia, decreased appetite, nor feeling shaky or jittery with stimulant medication.       6/23/25:  Patient presents today in office since last visit patient was started on Adderall XR 10 mg for which patient reports \"I don't think its working for me, my anxiety is 3 times higher, and I am getting horrible migraines I never had before, I get dizzy and throw up, and I have to go to sleep.\"  The first 2 days felt brain was calm, though since that time symptoms have worsened. Patient felt like she needed to give time to adjust, though has not improved.  Patient takes Adderall XR 10 mg at 730-8am. Has to take melatonin at bedtime which is not new, feels insomnia is related to anxiety. The longest headache lasted 8 hours, despite cold cloth to head, dark room, and is debilitated by the severity of headaches. Patient continues to take Wellbutrin  mg every morning. Patient did try taking the Wellbutrin at night for 5 nights consecutively, though headaches continued. Patient has missed 3 days due to waking up later and has approximately 6 pills remaining. And " "believes she has 5 of the 150 mg Wellbutrin XL. Patient will drink a diet coke upon feeling the headache come on, and it hasn't went away. Patient does eat before taking the Adderall in the morning.     \"Anxiety is horrible, never been this bad before.\" Explains every single day feels stomach \"drop and explosion in head, thoughts wont stop going, and it ruins my day. Which further worsens depression. I feel like its a loop every single day.\"    ADHD:  Symptoms include inability to follow directions, inattention, need for frequent task redirection, forgetfulness, careless mistakes, losing things, and avoiding mental tasks. The symptoms occur at home and at work. The symptoms are described as Moderate in severity. The symptoms are described as unchanged. Symptoms are exacerbated by work environment, fatigue, distracting activities, conversation, and mental effort. Symptoms are relieved by nothing at this time. Associated symptoms include anxiety disorder and major depression. Current treatment includes behavior modification, a structured daily routine, educational interventions, dextroamphetamine/amphetamine (Adderall), and individual therapy. By report, Brenda is compliant all of the time.    Denies side effects of elevated heart rate, elevated blood pressure, irritability, insomnia, decreased appetite, nor feeling shaky or jittery with stimulant medication.     Depression: Patient complains of depression. She complains of anhedonia, depressed mood, difficulty concentrating, fatigue, and feelings of worthlessness/guilt  Anxiety:  The patient endorses to the following symptoms of anxiety including: excessive anxiety and worry about a number of events or activities for more days than not, restlessness or feeling keyed up, being easily fatigued, difficulty concentrating or mind going blank, irritability, and sleep disturbance which have caused impairment in important areas of daily functioning.  The patient has had " "symptoms of anxiety for several years, which have worsened over the last month. The patient rates their anxiety at a 10/10 on a 0-10 scale, with 10 being the worst.    BP Readings from Last 3 Encounters:   06/23/25 110/70   05/19/25 110/70   04/24/25 108/70     Pulse Readings from Last 3 Encounters:   06/23/25 85   05/19/25 104   04/24/25 78         Wt Readings from Last 3 Encounters:   06/23/25 82.1 kg (181 lb)   05/19/25 82.7 kg (182 lb 6.4 oz)   04/24/25 82 kg (180 lb 12.8 oz)          5/19/25:    Answers submitted by the patient for this visit:  Problem not listed (Submitted on 5/19/2025)  Chief Complaint: Other medical problem  Reason for appointment: drug test for adderall  Onset: 1 to 6 months  Medications tried: therapy    Patient presents today in office at last visit planned to start patient on Adderall XR for management of ADHD, however, UDS confirmation of THC showed a level of \"11\", repeating UDS, pregnancy today as patient reported last marijuana use was 3/9/25. Patient reports she had a panic attack over the weekend, \"I was in my brain.\" Patient was thinking of college plan, and hasn't started masters as planned, though was able to get through it with positive thoughts.    Per chart review noted patient had reported to therapist at visit on 5/12/25 of being out of Wellbutrin  mg for approximately 1 week and was feeling more depressed, noted pharmacy had filled prescription dated 12/11/24 from PCP for #30/30 days, however, the 150 mg was noted as last dispensed in EHR 5/12/25 from prescription dated 2/27/25 per this provider, which was written for 90 days. Patient recalls while on phone with Pancho had requested before hearing the prescription number, though confirmed only taking 300 mg.  Patient was off Wellbutrin XL for approximately 1.5 weeks due to forgetting to fill. Did experience bad headaches, then started feeling more depressed, wanted to sleep and stay on couch.     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     Patient denies any further marijuana use, despite positive UDS for THC today, and when parents smoke they are in garage and patient is in the house in bedroom. Denies any passive exposure as boyfriend does not drink or use drugs.       ADHD:  Symptoms include impulsivity, inattention, need for frequent task redirection, and forgetfulness. The symptoms occur at home, at work, and during social events.  The symptoms are described as Moderate in severity. The symptoms are described as unchanged. Symptoms are exacerbated by work environment, fatigue, distracting activities, and conversation. Symptoms are relieved by attention holding activities. Associated symptoms include anxiety disorder and major depression. Current treatment includes behavior modification, a structured daily routine, educational interventions, individual therapy, and Wellbutrin  mg. By report, Brenda is compliant most of the time.    Denies side effects of elevated heart rate, elevated blood pressure, irritability, insomnia, decreased appetite, nor feeling shaky or jittery with stimulant medication.         4/24/25:    Answers submitted by the patient for this visit:  Problem not listed (Submitted on 4/23/2025)  Chief Complaint: Other medical problem  Reason for appointment: controlled substance agreement  anorexia: No  joint pain: No  change in stool: No  joint swelling: No  swollen glands: No  vertigo: No  visual change: No  Onset: at an unknown time  Chronicity: recurrent  Frequency: constantly  Medications tried: none  Additional information: just adhd    Patient presents today in office to discuss alternative options for management of ADHD, as patient voiced at last visit of no longer smoking marijuana.  At last visit Wellbutrin XL was decreased from 450 mg to 300 mg due to suspected  "worsened anxiety.     At times patient will feel down, and goes to sleep then wakes up and is fine.  Has noticed she is easily irritated, angered with minuscule things-hit elbow on keyboard, computer not working correctly, scale is beeping alerting her the money she is counting will become easily angered though try's to not let it impair the rest of the day, though sometimes it will, though has been practicing coping strategies learned with therapist.     Patient reports anxiety is about the same, \"not having any breakdowns, its more the depression comes out of nowhere.\" Has been going to sleep at 7pm and wakes at 530 am for work. Which patient attributes to schedule change, working 6 days per week for the last 6 weeks.     Patient continues to remain free of marijuana use and does wish to start a stimulant.     ADHD:  Symptoms include inattention, need for frequent task redirection, forgetfulness, careless mistakes, losing things, and avoiding mental tasks. The symptoms occur at home and at work. The symptoms are described as Moderate in severity. The symptoms are described as unchanged. Symptoms are exacerbated by work environment, fatigue, and distracting activities. Symptoms are relieved by attention holding activities though not completely. Associated symptoms include anxiety disorder and major depression. Current treatment includes behavior modification, educational interventions, and Wellbutrin  mg. By report, Brenda is compliant all of the time.      4/11/25:   Answers submitted by the patient for this visit:  Anxiety (Submitted on 4/7/2025)  Chief Complaint: Anxiety  Visit: follow-up  Frequency: constantly  Severity: severe  depressed mood: Yes  dry mouth: No  excessive worry: Yes  insomnia: Yes  irritability: Yes  malaise/fatigue: Yes  obsessions: Yes  Sleep quality: fair  Hours of sleep per night: 7 Hours  Aggravated by: family issues, social activities, work stress  Medication compliance: " "%  Side effects: sexual problems    Patient presents today in office at last visit Wellbutrin XL was increased from 300 to 450 mg daily, for which patient reports not doing well with higher dose.   Patient reports calling MA on direct line on 3/27/25 that she wasn't doing well, the medicine hasn't done anything for me, I feel I been getting a little worse.\" Explains she is more anxious, having \"melt downs\", will start fanning hands when feeling overwhelmed, could occur while putting groceries away and being asked to do something else which may be a small task, and will start crying. Having difficulty regulating emotions, has tried to implement breathing exercises, though still starts to cry. Patient did get a new fidget toy she uses, will hug self tight to help calm self, which helps.     Patient is still taking the Wellbutrin  mg, however, feels the Lexapro is ineffective, possibly causing more anxiety, and had weaned self off with the 10 mg old dose and denied withdrawal symptoms, which was approximately 2 weeks ago.     Patient continues to participate in therapy.    Patient did stop smoking marijuana 1 month ago and 4 days ago and wanted to try an ADHD medication. Patient does report the first 2 weeks felt more irritable, something's have been harder, sleep and dreams returning, feels anxious in dream and wakes up anxious, dreams about past trauma. Wellbutrin increased dose at last visit has been ineffective in management of ADHD symptoms.     BP Readings from Last 3 Encounters:   04/11/25 118/80   04/07/25 126/82   02/27/25 122/80     Pulse Readings from Last 3 Encounters:   04/11/25 84   04/07/25 104   02/27/25 98     Wt Readings from Last 3 Encounters:   04/11/25 82.4 kg (181 lb 9.6 oz)   04/07/25 80.9 kg (178 lb 6.4 oz)   02/27/25 82.8 kg (182 lb 9.6 oz)       2/27/25:    Answers submitted by the patient for this visit:  Anxiety (Submitted on 2/27/2025)  Chief Complaint: Anxiety  Visit: " "follow-up  Frequency: constantly  Severity: severe  depressed mood: Yes  dry mouth: No  excessive worry: Yes  insomnia: Yes  irritability: Yes  malaise/fatigue: No  obsessions: Yes  Sleep quality: fair  Hours of sleep per night: 8 Hours  Aggravated by: family issues  Medication compliance: %  Side effects: nausea    Patient presents today in office for further assessment of overlapping symptoms presented at last visit.     Patient does admit to struggling with anxiety the most, which further makes other things worse.    Spending excess amount of money for 2 weeks, then later tells herself to snap out of it, and will do good then it comes back. One day will decide to go shopping or go online and buy without thinking, doesn't realize the purchase was not needed until she see's her bank account. Uses linked credit card to [employer] or if it is a big purchase, though sometimes will use [employer] for the cash back percentage on other purchases. Will find self during this time speeding but no higher than  mph.  Patient had lost drivers license and got it renewed and is more cautious about ensuring she is following the rules of the road, as patient has old drivers license which is , other times during these episodes feels she can get away with getting pulled over, do whatever you want you will be fine, don't think about the consequences.\"Plans to go to [facility] soon.  Denies sleep deprivation, has more energy while out doing things, though loves to sleep.  No one has told her she was out of character during that time.  Feels boyfriend would recognize, and has said stuff before about spending money he will ask do you know how much your spending.  Patient does pay bills on time, as patient gets anxious about bills fears she would forget to pay which would cause disappointment.     Impulsivity:   At work used to do internet orders, though would overwhelm self with the steps to do the job, \"I should have got a cart " "first, forgets basic steps, thinks about I should have done x,y, z first and thought about that first.\"  Food impulsively eats something, and didn't think about other meals, not planned out accordingly.    Easily distracted, often forgets what she is doing, takes awhile to get back on track with several things.     Last year took an abnormal psychology class and felt she met criteria for adhd or bipolar 2 and told mom, and needed to schedule an appointment. Best friend had asked if she had ever been tested for ADHD.     ADHD:   Elementary school:   Grades: struggled with math, and really struggled 5th grade and thereafter, got first C that year and had to do school on Saturday; C's maybe a B once in math  Special classes or failures: No (only in college)  Got in trouble: No  Referral for ADHD testing: No \"they just thought I used all that energy on sports, went through several sports, soccer. everyone just called me loud and hyper, just a kid being a kid.\"  Fhx: none known, though suspects sister and cousin-family doesn't believe   Presently:  Problems with attention to detail: Yes (frequently submits incomplete work, goes back to fix mistakes, overlooks or misses details, work is inaccurate) was also difficult during school  Problems with sustained attention: Yes (Difficulty remaining focused during lectures-did record a few lectures pearl year of college due to great difficulty, day dreams a lot, would try to force self to listen, conversations, lengthy reading-did some in class if peaked interest, would try though as reading unable to understand what she read  Problems listening when spoken to directly: Yes (mind seems elsewhere, even in the absences of any obvious distraction)   Failure to finish tasks: Yes happens a lot at work and home, will start on task intended, then original task never completed, and dad will text her telling her once again you forgot to complete.  Avoids/Dislikes/Reluctant to engage in " "tasks that require sustained mental effort: Yes (schoolwork, homework,preparing reports,completing forms, reviewing lengthy papers) procrastinates   Easily distracted: Yes (unrelated thoughts, external factors) day dreams often  Forgetting things: Yes (daily chores, running errands, returning calls,, keeping appointments) able to remember to pay bills timely; went to work this morning and was not scheduled despite following routine to go to work while clocking in she received a message asking if she was still wanting to clock in as patient was not on the schedule; forgot to turn off car and in store for 1.5 hrs and thought she lost her keys-had several people looking and found the keys in car while vehicle still running-which had a regular key in the ignition.   Losing things: Yes (school materials, student ID-5-6 times, wallets, keys, cell phone, last year she got a new bank card 9 times, has lost drivers license multiple times)  Hard to organize: Yes difficulty managing sequential tasks, keeping materials and belongings in order, messy, disorganized work, poor time management, fails to meet deadlines at times sometimes missed assignments, if boss at work gave deadline is able to get it done but if gave self a deadline will not meet, keeps pushing it off, poor planning, prioritizing)   Talks a lot and cutting people off:Yes \"I talk so much, its all I really do, I used to cut people off, but when I was younger I learned it was rude, I actively try to make an effort to not cut people off.\" Voted most  talkative [facility]  Drifts off during conversations: Yes  Difficulty with Reading: Yes, has tried audio books though finds it boring, re-reads  Xiii. Difficulty watching TV/Movies: Yes, easily distracted and bored, needs to do another task to listen to a show or movie- will be coloring while watching otherwise does not follow what is going on and has to rewind, turn on captions.    Several inattentive or " "hyperactive-impulsive symptoms are present in 2 or more settings (home,work, school, with friends or relatives, in other activities).  Clear evidence the symptoms interfere with, or reduce the quality of, social, academic, or occupational functioning.        2/11/25:  INITIAL EVAL  Answers submitted by the patient for this visit:  Depression (Submitted on 2/9/2025)  Chief Complaint: Depression  Visit: initial  Onset: 1 to 5 years ago  Progression since onset: worse  Frequency: constantly  Severity: severe  excessive worry: Yes  insomnia: Yes  irritability: Yes  malaise/fatigue: Yes  obsessions: Yes  hypersomnia: Yes  difficulty controlling mood: Yes  difficulty staying asleep: Yes  difficulty falling asleep: Yes  Hours of sleep per night: 6 Hours  Aggravated by: nothing, family issues, social activities    Provider introduced self, as well as credentials, and informed of provider role which will primarily include medication management of mental health conditions. Explained the difference between provider role vs. therapy/counseling services which include CBT (talk therapy) and do not include management of medications which are typically 45 minutes to 1 hr in length, however, if patient was in agreement to start therapy this provider can provide a contact list and/or refer. Patient verbalized understanding and agreed to proceed.    Patient presents today in office with a history of anxiety and depression for which treatment began in the 6th grade.  Patient is currently prescribed Wellbutrin  mg every morning ( mg twice daily 2/2022-6/2023, then changed to  mg formulation) and Lexapro 20 mg every morning (9/17/24 10 mg, increased to 20 mg 11/6/24), which have provided effectiveness, though feels medications are not as effective as they were previously.  Patient denies significant PMHX.    Patient goal of treatment \"I want to get into a mindset where I wake up everyday, and don't think I am running " "out time, I want my mind to stop, peace and tranquility.\"    Patient has noticed physical symptoms of anxiety- shaky, sweaty, stutters, nausea, vomits. And will also find self getting up in the middle of the night due to not eating much during the day and binge eating. Denies purging behavior.    Anxiety: goes to work, to bed on time, getting up on time. Patient has tried to focus on a healthier diet, though it stresses her out, then ends up binging. Patient enjoys job at [employer], gets to work with boyfriend and best friend, though doesn't understand why she gets so anxious.  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  When patient is at boyfriend house will shut self in room and avoids going out to open areas by herself as his parents are in the home.  Patient has been with boyfriend for over 2 yrs and reports his family has treated her with respect, loving family whom have welcomed patient into their lives. They have went on vacations, family gatherings, and feels she should be comfortable eating around them, helping herself to fridge, etc while in their home, but isn't.    Symptoms started approximately her sophomore year of college, 2.5 yrs ago. Patient lived on campus at [facility] and was excited about going. 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  I think he really messed me up, he did a number on my self worth.\" He was from [facility], his name is Kit, and his beauty standards are \"tiny women\", a month after they stared dating would say, \"your gonna wear that or eat that, and stop eating a week before we go to his mom's.\" He was patient first official relationship. \"I gave him my first everything.\" Explains his mother would say similar things, and felt judged on how much she ate or what she wore.  Chelsea break up, as patient started " "realizing she was not happy. Best friend would question why patient would allow him and his mother to speak to her in such a manner. \"I was scared to break up with him.\"    Scared of being judged. Especially around food, always go back to food, boyfriend name is Gallo. Will hide bag of food, drinks or he will carry food. Will not eat in front of his parents, will take 2 bites and then feels sick at her stomach.    Bed weight of 194 lbs was at the ED and bed may have not been zeroed out prior to weight due to fluctuation value noted.   Wt Readings from Last 3 Encounters:   02/11/25 83.2 kg (183 lb 6.4 oz)   01/15/25 88.3 kg (194 lb 10.7 oz)   11/06/24 82.3 kg (181 lb 6.4 oz)     \"Physical feeling in brain not feeling fine for so long, something off.  Spending-I usually am a really anxious spender, but recently I want this and I will get this without 2nd thought, I am not saving money at all. Thoughts I feel I would never have, why am I here, too scared to kill self; feel life is too short; do you really have enough time to do that. Everyday is constantly going back and forth with self in head. Self doubt. Simple tasks-prioritizing, then hasn't done anything productive. Loses sense of time. Forget a lot more than used too. Eye doctor appointment I thought it was yesterday but its 21st.\" Symptoms present for almost 2 yrs.     If others don't use turn signal while driving, if stuff isn't done the way it is supposed to be done then this bad thing will happen. I've only felt this way for the last 2.5 yrs. I feel I am always weighed down, I can think of things Kit said and I am instantly sick and will shut down.\"     \"Lexapro has helped, but also feels it not like it used to and now I am back at square 1. Routine is the same. Not doing what it should be doing. Possible ADHD?\"    Psych: Eating Disorder The patient admits to binge eating episodes, described as occurring nearly everyday, eating excessive amount of calories at " "one time in secret .    PTSD:  Persistent and exaggerated negative beliefs or expectations about oneself, others, or the world (e.g., \"I am bad,\" \"No one can be trusted,\" \"The world is completely dangerous,\" \"My whole nervous system is permanently ruined\"), Persistent, distorted cognitions about the cause or consequences of the traumatic event(s) that lead the individual to blame himself/herself or others, Persistent negative emotional state (e.g., fear, horror, anger, guilt, or shame), Markedly diminished interest or participation in significant activities, and Persistent inability to experience positive emotions (e.g., inability to experience happiness, satisfaction, or loving feelings)  Intrusive thoughts of trauma, Triggers present, and Intense psychologic distress    Depression: Patient complains of depression. She complains of anhedonia, depressed mood, difficulty concentrating, fatigue, feelings of worthlessness/guilt, hopelessness, and insomnia     Anxiety:  The patient endorses to the following symptoms of anxiety including: excessive anxiety and worry about a number of events or activities for more days than not, restlessness or feeling keyed up, being easily fatigued, difficulty concentrating or mind going blank, irritability, and sleep disturbance which have caused impairment in important areas of daily functioning.      PHQ-9 Depression Screening  PHQ-9 Total Score:  6/23/2025 21   The PHQ has not been completed during this encounter.          BETI-7   6/23/2025 20    The following portions of the patient's history were reviewed and updated as appropriate: allergies, current medications, past family history, past medical history, past social history, and past surgical history.     Past Surgical History:  Past Surgical History:   Procedure Laterality Date    WISDOM TOOTH EXTRACTION Bilateral        Problem List:  Patient Active Problem List   Diagnosis    Anxiety and depression    Encounter for " surveillance of vaginal ring hormonal contraceptive device    Screening for viral disease    Immunization due    Routine gynecological examination       Allergy:   No Known Allergies     Discontinued Medications:  Medications Discontinued During This Encounter   Medication Reason    dicyclomine (BENTYL) 20 MG tablet Patient Reported Not Taking    buPROPion XL (WELLBUTRIN XL) 300 MG 24 hr tablet Historical Med - Therapy completed             Current Medications:   Current Outpatient Medications   Medication Sig Dispense Refill    amphetamine-dextroamphetamine XR (Adderall XR) 10 MG 24 hr capsule Take 1 capsule by mouth Every Morning Indications: Attention Deficit Hyperactivity Disorder 30 capsule 0    etonogestrel-ethinyl estradiol (EnilloRing) 0.12-0.015 MG/24HR vaginal ring Insert vaginally and leave in place for 3 consecutive weeks, then remove for 1 week. 9 each 3    hydrOXYzine (ATARAX) 10 MG tablet Take 1 tablet by mouth 3 (Three) Times a Day As Needed for Anxiety. DO NOT take any other Antihistamine or Allergy medication within 4 hrs of taking Hydroxyzine.  Indications: Feeling Anxious 20 tablet 0    ondansetron ODT (ZOFRAN-ODT) 4 MG disintegrating tablet Place 1 tablet on the tongue Every 8 (Eight) Hours As Needed for Nausea or Vomiting. 15 tablet 0    pantoprazole (PROTONIX) 40 MG EC tablet Take 1 tablet by mouth Daily. 90 tablet 1    busPIRone (BUSPAR) 5 MG tablet Take 1 tablet by mouth Daily for 4 days, THEN 1 tablet 2 (Two) Times a Day for 26 days. Indications: Anxiety Disorder 56 tablet 0     No current facility-administered medications for this visit.       Past Medical History:  Past Medical History:   Diagnosis Date    Anxiety     Depression     Self-injurious behavior        Past Psychiatric History:  Began Treatment:6th grade  Diagnoses:Depression and Anxiety  Psychiatrist:Crist[facility]  [facility]  Admission History:Denies  Medication Trials:Wellbutrin  mg twice daily 2/2022-6/2023 due  "to keeping up with 2 times daily dosing,then switched to  mg; Lexapro up to 20 mg 2024-end of 3/2025 due to ineffectiveness, worsened anxiety which could have been related to Wellbutrin XL which was increased from 300 to 450 mg 2025 Wellbutrin XL tapered to stop end of 2025 due to severe headaches in combination with Adderall XR 10 mg.   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  Kids:No  House:Lives in a house with parents and brother & sister   History: Denies  Access to Guns:  Yes, used to be locked but cleaning currently-old shot gun, chace guns    Social History     Socioeconomic History    Marital status: Significant Other    Number of children: 0    Highest education level: Bachelor's degree (e.g., BA, AB, BS)   Tobacco Use    Smoking status: Never     Passive exposure: Past    Smokeless tobacco: Never   Vaping Use    Vaping status: Never Used   Substance and Sexual Activity    Alcohol use: Yes     Comment: socially    Drug use: Not Currently     Types: Marijuana     Comment: last Marijuana use 3/7/25 approx. updated 25.    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   HENT:  Negative for congestion and sore throat.    Respiratory:  Negative for cough and shortness of breath.    Cardiovascular:  Negative for chest pain and palpitations.   Gastrointestinal:  Negative for abdominal pain, nausea and vomiting.   Genitourinary:  Negative for dysuria.   Musculoskeletal:  Negative for myalgias and neck pain.   Skin:  Negative for rash.   Neurological:  Positive for headaches. Negative for dizziness, seizures, weakness and numbness.        Minimal   Psychiatric/Behavioral:  Positive for decreased concentration. Negative for confusion, self-injury, sleep disturbance and suicidal ideas. The patient is nervous/anxious. The patient is not hyperactive.          Physical Exam:  Physical Exam  Psychiatric:         Attention and Perception: Attention and perception normal.         Mood and Affect: Mood is anxious. Mood is not depressed.         Speech: Speech normal.         Behavior: Behavior normal. Behavior is cooperative.         Thought Content: Thought content normal. Thought content does not include suicidal ideation. Thought content does not include  "suicidal plan.         Cognition and Memory: Cognition and memory normal.         Judgment: Judgment normal.         Vital Signs:   /82   Pulse 85   Ht 152.4 cm (60\")   Wt 83.3 kg (183 lb 9.6 oz)   BMI 35.86 kg/m²            Lab Results:   Clinical Support on 05/19/2025   Component Date Value Ref Range Status    THC, Urine 05/19/2025 Negative  Cutoff=10 Final    Please note 05/19/2025 Comment   Final    Drug test results should be interpreted in the context of clinical  information. Patient metabolic variables, specific drug chemistry, and  specimen characteristics can affect test outcome. Technical  consultation is available if a test result is inconsistent with an  expected outcome.    Email:  [email or web address]    Phone:  [phone]   Office Visit on 05/19/2025   Component Date Value Ref Range Status    Amphetamine Screen, Urine 05/19/2025 Negative  Negative Final    Barbiturates Screen, Urine 05/19/2025 Negative  Negative Final    Buprenorphine, Screen, Urine 05/19/2025 Negative  Negative Final    Benzodiazepine Screen, Urine 05/19/2025 Negative  Negative Final    Cocaine Screen, Urine 05/19/2025 Negative  Negative Final    MDMA (ECSTASY) 05/19/2025 Negative  Negative Final    Methamphetamine, Ur 05/19/2025 Negative  Negative Final    Morphine/Opiates Screen, Urine 05/19/2025 Negative  Negative Final    Methadone Screen, Urine 05/19/2025 Negative  Negative Final    Oxycodone Screen, Urine 05/19/2025 Negative  Negative Final    Phencyclidine (PCP), Urine 05/19/2025 Negative  Negative Final    THC, Screen, Urine 05/19/2025 Positive (A)  Negative Final    Lot Number 05/19/2025 [id number]   Final    Expiration Date 05/19/2025 1-16-27   Final    HCG, Urine, QL 05/19/2025 Negative  Negative Final    Lot Number 05/19/2025 #[id number]   Final    Internal Positive Control 05/19/2025 Passed  Positive, Passed Final    Internal Negative Control 05/19/2025 Passed  Negative, Passed Final    Expiration " Date 05/19/2025 7-24-26   Final   Lab on 04/29/2025   Component Date Value Ref Range Status    Total Cholesterol 04/29/2025 198  0 - 200 mg/dL Final    Triglycerides 04/29/2025 110  0 - 150 mg/dL Final    HDL Cholesterol 04/29/2025 50  40 - 60 mg/dL Final    LDL Cholesterol  04/29/2025 128 (H)  0 - 100 mg/dL Final    VLDL Cholesterol 04/29/2025 20  5 - 40 mg/dL Final    LDL/HDL Ratio 04/29/2025 2.52   Final    Glucose 04/29/2025 82  65 - 99 mg/dL Final    BUN 04/29/2025 8  6 - 20 mg/dL Final    Creatinine 04/29/2025 0.55 (L)  0.57 - 1.00 mg/dL Final    Sodium 04/29/2025 139  136 - 145 mmol/L Final    Potassium 04/29/2025 3.9  3.5 - 5.2 mmol/L Final    Chloride 04/29/2025 107  98 - 107 mmol/L Final    CO2 04/29/2025 20.8 (L)  22.0 - 29.0 mmol/L Final    Calcium 04/29/2025 8.8  8.6 - 10.5 mg/dL Final    Total Protein 04/29/2025 7.1  6.0 - 8.5 g/dL Final    Albumin 04/29/2025 4.0  3.5 - 5.2 g/dL Final    ALT (SGPT) 04/29/2025 23  1 - 33 U/L Final    AST (SGOT) 04/29/2025 24  1 - 32 U/L Final    Alkaline Phosphatase 04/29/2025 48  39 - 117 U/L Final    Total Bilirubin 04/29/2025 0.2  0.0 - 1.2 mg/dL Final    Globulin 04/29/2025 3.1  gm/dL Final    A/G Ratio 04/29/2025 1.3  g/dL Final    BUN/Creatinine Ratio 04/29/2025 14.5  7.0 - 25.0 Final    Anion Gap 04/29/2025 11.2  5.0 - 15.0 mmol/L Final    eGFR 04/29/2025 133.9  >60.0 mL/min/1.73 Final    TSH 04/29/2025 0.887  0.270 - 4.200 uIU/mL Final    WBC 04/29/2025 7.48  3.40 - 10.80 10*3/mm3 Final    RBC 04/29/2025 4.53  3.77 - 5.28 10*6/mm3 Final    Hemoglobin 04/29/2025 12.6  12.0 - 15.9 g/dL Final    Hematocrit 04/29/2025 38.5  34.0 - 46.6 % Final    MCV 04/29/2025 85.0  79.0 - 97.0 fL Final    MCH 04/29/2025 27.8  26.6 - 33.0 pg Final    MCHC 04/29/2025 32.7  31.5 - 35.7 g/dL Final    RDW 04/29/2025 12.5  12.3 - 15.4 % Final    RDW-SD 04/29/2025 39.8  37.0 - 54.0 fl Final    MPV 04/29/2025 10.3  6.0 - 12.0 fL Final    Platelets 04/29/2025 241  140 - 450 10*3/mm3 Final     Neutrophil % 04/29/2025 50.5  42.7 - 76.0 % Final    Lymphocyte % 04/29/2025 43.6  19.6 - 45.3 % Final    Monocyte % 04/29/2025 4.5 (L)  5.0 - 12.0 % Final    Eosinophil % 04/29/2025 0.9  0.3 - 6.2 % Final    Basophil % 04/29/2025 0.4  0.0 - 1.5 % Final    Immature Grans % 04/29/2025 0.1  0.0 - 0.5 % Final    Neutrophils, Absolute 04/29/2025 3.77  1.70 - 7.00 10*3/mm3 Final    Lymphocytes, Absolute 04/29/2025 3.26 (H)  0.70 - 3.10 10*3/mm3 Final    Monocytes, Absolute 04/29/2025 0.34  0.10 - 0.90 10*3/mm3 Final    Eosinophils, Absolute 04/29/2025 0.07  0.00 - 0.40 10*3/mm3 Final    Basophils, Absolute 04/29/2025 0.03  0.00 - 0.20 10*3/mm3 Final    Immature Grans, Absolute 04/29/2025 0.01  0.00 - 0.05 10*3/mm3 Final   Results Follow-Up on 04/24/2025   Component Date Value Ref Range Status    THC, Urine 04/24/2025 Positive (A)   Final    Carboxy THC, Urine 04/24/2025 11  Cutoff=10 ng/mL Final    Please note 04/24/2025 Comment   Final    Drug test results should be interpreted in the context of clinical  information. Patient metabolic variables, specific drug chemistry, and  specimen characteristics can affect test outcome. Technical  consultation is available if a test result is inconsistent with an  expected outcome.    Email:  [email or web address]    Phone:  [phone]   Office Visit on 04/24/2025   Component Date Value Ref Range Status    Amphetamine Screen, Urine 04/24/2025 Negative  Negative Final    Barbiturates Screen, Urine 04/24/2025 Negative  Negative Final    Buprenorphine, Screen, Urine 04/24/2025 Negative  Negative Final    Benzodiazepine Screen, Urine 04/24/2025 Negative  Negative Final    Cocaine Screen, Urine 04/24/2025 Negative  Negative Final    MDMA (ECSTASY) 04/24/2025 Negative  Negative Final    Methamphetamine, Ur 04/24/2025 Negative  Negative Final    Morphine/Opiates Screen, Urine 04/24/2025 Negative  Negative Final    Methadone Screen, Urine 04/24/2025 Negative  Negative Final     Oxycodone Screen, Urine 04/24/2025 Negative  Negative Final    Phencyclidine (PCP), Urine 04/24/2025 Negative  Negative Final    THC, Screen, Urine 04/24/2025 Positive (A)  Negative Final    Lot Number 04/24/2025 [id number]   Final    Expiration Date 04/24/2025 11-7-26   Final    HCG, Urine, QL 04/24/2025 Negative  Negative Final    Lot Number 04/24/2025 #[id number]   Final    Internal Positive Control 04/24/2025 Passed  Positive, Passed Final    Internal Negative Control 04/24/2025 Passed  Negative, Passed Final    Expiration Date 04/24/2025 5-26-26   Final   Office Visit on 04/07/2025   Component Date Value Ref Range Status    Color 04/07/2025 Yellow  Yellow, Straw, Dark Yellow, Sharita Final    Clarity, UA 04/07/2025 Clear  Clear Final    Specific Gravity  04/07/2025 1.020  1.005 - 1.030 Final    pH, Urine 04/07/2025 8.0  5.0 - 8.0 Final    Leukocytes 04/07/2025 Negative  Negative Final    Nitrite, UA 04/07/2025 Negative  Negative Final    Protein, POC 04/07/2025 Negative  Negative mg/dL Final    Glucose, UA 04/07/2025 Negative  Negative mg/dL Final    Ketones, UA 04/07/2025 Negative  Negative Final    Urobilinogen, UA 04/07/2025 0.2 E.U./dL  Normal, 0.2 E.U./dL Final    Bilirubin 04/07/2025 Negative  Negative Final    Blood, UA 04/07/2025 Trace (A)  Negative Final    Lot Number 04/07/2025 [id number]   Final    Expiration Date 04/07/2025 10/31/25   Final    Diagnosis 04/07/2025 Comment   Final    NEGATIVE FOR INTRAEPITHELIAL LESION OR MALIGNANCY.    Specimen adequacy: 04/07/2025 Comment   Final    Satisfactory for evaluation.  Endocervical and/or squamous metaplastic  cells (endocervical component) are present.    Clinician Provided ICD-10: 04/07/2025 Comment   Final    Z01.419    Performed by: 04/07/2025 Comment   Corrected    Timo Niño, Cytologist (ASCP)    . 04/07/2025 .   Final    Note: 04/07/2025 Comment   Final    The Pap smear is a screening test designed to aid in the detection of  premalignant and  malignant conditions of the uterine cervix.  It is not a  diagnostic procedure and should not be used as the sole means of detecting  cervical cancer.  Both false-positive and false-negative reports do occur.       EKG Results:  No orders to display       Imaging Results:  CT Abdomen Pelvis With Contrast    Result Date: 1/15/2025  Impression: 1.No acute intra-abdominal or intrapelvic abnormality identified. Electronically Signed: Abhi Jauregui MD  1/15/2025 12:51 PM EST  Workstation ID: [id number]        Assessment & Plan   Diagnoses and all orders for this visit:    1. Generalized anxiety disorder (Primary)  -     busPIRone (BUSPAR) 5 MG tablet; Take 1 tablet by mouth Daily for 4 days, THEN 1 tablet 2 (Two) Times a Day for 26 days. Indications: Anxiety Disorder  Dispense: 56 tablet; Refill: 0    2. Attention deficit hyperactivity disorder, combined type    3. Medication management    4. Medication care plan discussed with patient    5. Medication dose increased                  Visit Diagnoses:    ICD-10-CM ICD-9-CM   1. Generalized anxiety disorder  F41.1 300.02   2. Attention deficit hyperactivity disorder, combined type  F90.2 314.01   3. Medication management  Z79.899 V58.69   4. Medication care plan discussed with patient  Z71.89 V65.49   5. Medication dose increased  Z79.899 V58.69         PLAN:  Safety: No acute safety concerns  Therapy: Currently Seeing a Therapist [facility] with Juana Templeton LCSW  Risk Assessment: Risk of self-harm acutely is moderate.  Risk factors include anxiety disorder and mood disorder, access to guns/weapons, daily marijuana use.  Protective factors include no family history, no present SI, no history of suicide attempts or self-harm in the past, healthcare seeking, future orientation, willingness to engage in care.  Risk of self-harm chronically is also moderate, but could be further elevated in the event of treatment noncompliance and/or AODA.  Meds:  -Removed  Wellbutrin XL from profile as patient completed taper, no longer having near daily headaches.     -Start  Buspar 5 mg by mouth daily for 4 days THEN INCREASE TO 5 mg by mouth twice daily to target anxiety. Absorption is affected by food, so administration with or without food should be consistent. Risks, benefits, alternatives discussed with patient including nausea, GI upset, dizziness, mild sedation, and falls risk.  After discussion of these risks and benefits, the patient voiced understanding and agreed to proceed. New prescription sent for #30 tabs/ 26 days.    -INCREASE Adderall XR FROM 10 mg TO 15 mg by mouth daily in the morning to target symptoms associated with ADHD. Risks, benefits, side effects discussed with patient including elevated heart rate, elevated blood pressure, irritability, insomnia, sexual dysfunction, appetite suppressing properties, psychosis.  After discussion of these risks and benefits, the patient voiced understanding and agreed to proceed. MANUAL DELBERT 7/29/25 AT 0702 #[id number] which has not resulted by end of visit, results received at 1007 indicating last dispense date of 7/2/25 #30/30 days. Patient reports not receiving text alerts and will just go to the pharmacy when supply is low.   Informed patient order would be sent to Dr. Phipps in separate entry for signature due to EMR system, and will not see as refilled on AVS today.   -Controlled substance documentation: Delbert reviewed; prior urine drug screen consistent obtained 5/19/25; consent is up to date, signed, witnessed and in EHR, dated 5/19/25, which will be updated annually per policy. Patient is aware of risk of addiction on this medication, understands need to follow up for a review every 3 months and medications will be adjusted or decreased as deemed appropriate at each visit.  No history of drug or alcohol abuse. No concerns about diversion or abuse. Patient denies side effects related to the medication.  Patient is  aware of random urine drug screens and pill counts. The dosing of this medication will be reviewed on a regular basis and reduced if possible. Ongoing use of a controlled substance is necessary for this patient to have a normal quality of life.     -Continue Hydroxyzine 10 mg by mouth 3 times daily as needed to target anxiety.  Due to potential drowsiness, instructed patient to not drive, use machinery, or do anything that needs mental alertness until you know how this medication affects you.    Instructed to not take any other Antihistamine/Allergy medication such as but not limited to:  Zyrtec, Claritin, Allegra, or Benadryl within 4 hrs of taking Hydroxyzine.  Risks, benefits, alternatives discussed with patient including sedation, dizziness, fall risk, GI upset, and risk of increased CNS depression and elevated heart rate if taken with other antihistamines.  After discussion of these risks and benefits, the patient voiced understanding and agreed to proceed.  #20, explained to patient the goal is to not add another medication to treat symptoms of another medication, however, due to severity of anxiety will trial the Hydroxyzine.   Labs: n/a  Advised patient to request text alerts with pharmacy.    Symptoms of anxiety and ADHD are under fair control with current medication regimen.    The plan was discussed with the patient. The patient was given time to ask questions and these questions were answered. At the conclusion of their visit they had no additional questions or concerns and all questions were answered to their satisfaction.   Patient was given instructions and counseling regarding condition and for health maintenance advice. Please see specific information pulled into the AVS if appropriate.    Patient to contact provider if symptoms worsen or fail to improve.      6/27/25: TELEPHONE  Patient KAILEE advising she was just calling in to give you an update on her meds so far since the change. Patient says she  "has been taking her regular Adderall with the Wellbutrin,still a little HA but no where near the HA she got when taking the 300mg of Wellbutrin.  The anxiety medication has been working great for her. \"That is her Update so far just wanted to let Lacy know.\" No need to callback    6/23/25:   -Continue Adderall XR 10 mg by mouth daily in the morning to target symptoms associated with ADHD. Risks, benefits, side effects discussed with patient including elevated heart rate, elevated blood pressure, irritability, insomnia, sexual dysfunction, appetite suppressing properties, psychosis.  After discussion of these risks and benefits, the patient voiced understanding and agreed to proceed. Kenney reviewed, last dispensed 5/24/25.  Patient reports having approximately 6 pills remaining as patient did not take a few days due to awakening at later hour.  -Controlled substance documentation: Kenney reviewed; prior urine drug screen consistent obtained 5/19/25; consent is up to date, signed, witnessed and in EHR, dated 5/19/25, which will be updated annually per policy. Patient is aware of risk of addiction on this medication, understands need to follow up for a review every 3 months and medications will be adjusted or decreased as deemed appropriate at each visit.  No history of drug or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

## 2025-08-08 ENCOUNTER — OFFICE VISIT (OUTPATIENT)
Age: 20
End: 2025-08-08
Payer: COMMERCIAL

## 2025-08-08 DIAGNOSIS — F90.2 ATTENTION DEFICIT HYPERACTIVITY DISORDER, COMBINED TYPE: Primary | ICD-10-CM

## 2025-08-08 DIAGNOSIS — F33.0 MAJOR DEPRESSIVE DISORDER, RECURRENT EPISODE, MILD: ICD-10-CM

## 2025-08-08 DIAGNOSIS — F41.1 GENERALIZED ANXIETY DISORDER: ICD-10-CM

## 2025-08-19 ENCOUNTER — OFFICE VISIT (OUTPATIENT)
Age: 20
End: 2025-08-19
Payer: COMMERCIAL

## 2025-08-19 DIAGNOSIS — F41.1 GENERALIZED ANXIETY DISORDER: ICD-10-CM

## 2025-08-19 DIAGNOSIS — F90.2 ATTENTION DEFICIT HYPERACTIVITY DISORDER, COMBINED TYPE: Primary | ICD-10-CM

## 2025-08-19 DIAGNOSIS — F33.0 MAJOR DEPRESSIVE DISORDER, RECURRENT EPISODE, MILD: ICD-10-CM

## 2025-08-29 ENCOUNTER — OFFICE VISIT (OUTPATIENT)
Dept: BEHAVIORAL HEALTH | Facility: CLINIC | Age: 20
End: 2025-08-29
Payer: COMMERCIAL

## 2025-08-29 VITALS
SYSTOLIC BLOOD PRESSURE: 110 MMHG | WEIGHT: 182 LBS | HEIGHT: 60 IN | DIASTOLIC BLOOD PRESSURE: 70 MMHG | HEART RATE: 91 BPM | BODY MASS INDEX: 35.73 KG/M2

## 2025-08-29 DIAGNOSIS — Z79.899 MEDICATION MANAGEMENT: ICD-10-CM

## 2025-08-29 DIAGNOSIS — F90.2 ATTENTION DEFICIT HYPERACTIVITY DISORDER, COMBINED TYPE: Primary | ICD-10-CM

## 2025-08-29 DIAGNOSIS — F41.1 GENERALIZED ANXIETY DISORDER: ICD-10-CM

## 2025-08-29 DIAGNOSIS — Z71.89 MEDICATION CARE PLAN DISCUSSED WITH PATIENT: ICD-10-CM
